# Patient Record
Sex: FEMALE | Race: WHITE | NOT HISPANIC OR LATINO
[De-identification: names, ages, dates, MRNs, and addresses within clinical notes are randomized per-mention and may not be internally consistent; named-entity substitution may affect disease eponyms.]

---

## 2017-01-23 ENCOUNTER — MEDICATION RENEWAL (OUTPATIENT)
Age: 37
End: 2017-01-23

## 2017-02-14 ENCOUNTER — MEDICATION RENEWAL (OUTPATIENT)
Age: 37
End: 2017-02-14

## 2017-02-16 ENCOUNTER — OUTPATIENT (OUTPATIENT)
Dept: OUTPATIENT SERVICES | Facility: HOSPITAL | Age: 37
LOS: 1 days | End: 2017-02-16
Payer: MEDICARE

## 2017-02-16 DIAGNOSIS — K08.9 DISORDER OF TEETH AND SUPPORTING STRUCTURES, UNSPECIFIED: ICD-10-CM

## 2017-02-16 PROCEDURE — D1110: CPT

## 2017-02-16 PROCEDURE — D0120: CPT

## 2017-02-17 DIAGNOSIS — Z01.20 ENCOUNTER FOR DENTAL EXAMINATION AND CLEANING WITHOUT ABNORMAL FINDINGS: ICD-10-CM

## 2017-02-28 ENCOUNTER — MEDICATION RENEWAL (OUTPATIENT)
Age: 37
End: 2017-02-28

## 2017-03-06 ENCOUNTER — MEDICATION RENEWAL (OUTPATIENT)
Age: 37
End: 2017-03-06

## 2017-03-06 ENCOUNTER — RX RENEWAL (OUTPATIENT)
Age: 37
End: 2017-03-06

## 2017-03-14 ENCOUNTER — APPOINTMENT (OUTPATIENT)
Dept: ULTRASOUND IMAGING | Facility: IMAGING CENTER | Age: 37
End: 2017-03-14

## 2017-03-14 ENCOUNTER — APPOINTMENT (OUTPATIENT)
Dept: MAMMOGRAPHY | Facility: IMAGING CENTER | Age: 37
End: 2017-03-14

## 2017-04-04 ENCOUNTER — RX RENEWAL (OUTPATIENT)
Age: 37
End: 2017-04-04

## 2017-05-01 ENCOUNTER — RX RENEWAL (OUTPATIENT)
Age: 37
End: 2017-05-01

## 2017-05-05 ENCOUNTER — MEDICATION RENEWAL (OUTPATIENT)
Age: 37
End: 2017-05-05

## 2017-05-09 ENCOUNTER — APPOINTMENT (OUTPATIENT)
Dept: NEUROLOGY | Facility: HOSPITAL | Age: 37
End: 2017-05-09

## 2017-05-09 ENCOUNTER — OUTPATIENT (OUTPATIENT)
Dept: OUTPATIENT SERVICES | Facility: HOSPITAL | Age: 37
LOS: 1 days | End: 2017-05-09
Payer: MEDICARE

## 2017-05-09 VITALS
WEIGHT: 125 LBS | BODY MASS INDEX: 23.6 KG/M2 | DIASTOLIC BLOOD PRESSURE: 73 MMHG | HEART RATE: 75 BPM | HEIGHT: 61 IN | SYSTOLIC BLOOD PRESSURE: 115 MMHG | RESPIRATION RATE: 14 BRPM

## 2017-05-09 DIAGNOSIS — G40.909 EPILEPSY, UNSPECIFIED, NOT INTRACTABLE, WITHOUT STATUS EPILEPTICUS: ICD-10-CM

## 2017-05-09 DIAGNOSIS — R56.9 UNSPECIFIED CONVULSIONS: ICD-10-CM

## 2017-05-09 PROCEDURE — 85027 COMPLETE CBC AUTOMATED: CPT

## 2017-05-09 PROCEDURE — G0463: CPT

## 2017-05-09 PROCEDURE — 36415 COLL VENOUS BLD VENIPUNCTURE: CPT

## 2017-05-11 DIAGNOSIS — F79 UNSPECIFIED INTELLECTUAL DISABILITIES: ICD-10-CM

## 2017-05-23 ENCOUNTER — MEDICATION RENEWAL (OUTPATIENT)
Age: 37
End: 2017-05-23

## 2017-06-09 ENCOUNTER — MEDICATION RENEWAL (OUTPATIENT)
Age: 37
End: 2017-06-09

## 2017-06-20 ENCOUNTER — APPOINTMENT (OUTPATIENT)
Dept: MAMMOGRAPHY | Facility: IMAGING CENTER | Age: 37
End: 2017-06-20

## 2017-06-20 ENCOUNTER — APPOINTMENT (OUTPATIENT)
Dept: ULTRASOUND IMAGING | Facility: IMAGING CENTER | Age: 37
End: 2017-06-20

## 2017-06-20 ENCOUNTER — OUTPATIENT (OUTPATIENT)
Dept: OUTPATIENT SERVICES | Facility: HOSPITAL | Age: 37
LOS: 1 days | End: 2017-06-20
Payer: MEDICARE

## 2017-06-20 DIAGNOSIS — Z00.8 ENCOUNTER FOR OTHER GENERAL EXAMINATION: ICD-10-CM

## 2017-06-20 PROCEDURE — 76641 ULTRASOUND BREAST COMPLETE: CPT

## 2017-06-28 ENCOUNTER — MEDICATION RENEWAL (OUTPATIENT)
Age: 37
End: 2017-06-28

## 2017-07-11 ENCOUNTER — RX RENEWAL (OUTPATIENT)
Age: 37
End: 2017-07-11

## 2017-08-04 ENCOUNTER — MEDICATION RENEWAL (OUTPATIENT)
Age: 37
End: 2017-08-04

## 2017-08-24 ENCOUNTER — OUTPATIENT (OUTPATIENT)
Dept: OUTPATIENT SERVICES | Facility: HOSPITAL | Age: 37
LOS: 1 days | End: 2017-08-24
Payer: MEDICARE

## 2017-08-24 DIAGNOSIS — K08.9 DISORDER OF TEETH AND SUPPORTING STRUCTURES, UNSPECIFIED: ICD-10-CM

## 2017-08-24 PROCEDURE — D0120: CPT

## 2017-08-24 PROCEDURE — D1110: CPT

## 2017-08-25 DIAGNOSIS — Z01.20 ENCOUNTER FOR DENTAL EXAMINATION AND CLEANING WITHOUT ABNORMAL FINDINGS: ICD-10-CM

## 2017-09-05 ENCOUNTER — RX RENEWAL (OUTPATIENT)
Age: 37
End: 2017-09-05

## 2017-09-14 ENCOUNTER — EMERGENCY (EMERGENCY)
Facility: HOSPITAL | Age: 37
LOS: 1 days | Discharge: HOME | End: 2017-09-14
Attending: EMERGENCY MEDICINE | Admitting: PERSONAL EMERGENCY RESPONSE ATTENDANT
Payer: MEDICARE

## 2017-09-14 VITALS
OXYGEN SATURATION: 97 % | HEART RATE: 110 BPM | DIASTOLIC BLOOD PRESSURE: 87 MMHG | TEMPERATURE: 97 F | RESPIRATION RATE: 20 BRPM | SYSTOLIC BLOOD PRESSURE: 117 MMHG

## 2017-09-14 LAB
ALBUMIN SERPL ELPH-MCNC: 4.7 G/DL — SIGNIFICANT CHANGE UP (ref 3.3–5)
ALP SERPL-CCNC: 52 U/L — SIGNIFICANT CHANGE UP (ref 40–120)
ALT FLD-CCNC: 15 U/L RC — SIGNIFICANT CHANGE UP (ref 10–45)
ANION GAP SERPL CALC-SCNC: 15 MMOL/L — SIGNIFICANT CHANGE UP (ref 5–17)
APTT BLD: 29.5 SEC — SIGNIFICANT CHANGE UP (ref 27.5–37.4)
AST SERPL-CCNC: 16 U/L — SIGNIFICANT CHANGE UP (ref 10–40)
BASE EXCESS BLDV CALC-SCNC: 3 MMOL/L — HIGH (ref -2–2)
BASOPHILS # BLD AUTO: 0 K/UL — SIGNIFICANT CHANGE UP (ref 0–0.2)
BILIRUB SERPL-MCNC: 1.1 MG/DL — SIGNIFICANT CHANGE UP (ref 0.2–1.2)
BUN SERPL-MCNC: 15 MG/DL — SIGNIFICANT CHANGE UP (ref 7–23)
CA-I SERPL-SCNC: 1.24 MMOL/L — SIGNIFICANT CHANGE UP (ref 1.12–1.3)
CALCIUM SERPL-MCNC: 9.6 MG/DL — SIGNIFICANT CHANGE UP (ref 8.4–10.5)
CHLORIDE BLDV-SCNC: 102 MMOL/L — SIGNIFICANT CHANGE UP (ref 96–108)
CHLORIDE SERPL-SCNC: 99 MMOL/L — SIGNIFICANT CHANGE UP (ref 96–108)
CO2 BLDV-SCNC: 31 MMOL/L — HIGH (ref 22–30)
CO2 SERPL-SCNC: 28 MMOL/L — SIGNIFICANT CHANGE UP (ref 22–31)
CREAT SERPL-MCNC: 0.41 MG/DL — LOW (ref 0.5–1.3)
EOSINOPHIL # BLD AUTO: 0 K/UL — SIGNIFICANT CHANGE UP (ref 0–0.5)
GAS PNL BLDV: 139 MMOL/L — SIGNIFICANT CHANGE UP (ref 136–145)
GAS PNL BLDV: SIGNIFICANT CHANGE UP
GAS PNL BLDV: SIGNIFICANT CHANGE UP
GLUCOSE BLDV-MCNC: 119 MG/DL — HIGH (ref 70–99)
GLUCOSE SERPL-MCNC: 123 MG/DL — HIGH (ref 70–99)
HCO3 BLDV-SCNC: 29 MMOL/L — SIGNIFICANT CHANGE UP (ref 21–29)
HCT VFR BLD CALC: 39.9 % — SIGNIFICANT CHANGE UP (ref 34.5–45)
HCT VFR BLDA CALC: 49 % — SIGNIFICANT CHANGE UP (ref 39–50)
HGB BLD CALC-MCNC: 16.1 G/DL — HIGH (ref 11.5–15.5)
HGB BLD-MCNC: 13.7 G/DL — SIGNIFICANT CHANGE UP (ref 11.5–15.5)
INR BLD: 1.3 RATIO — HIGH (ref 0.88–1.16)
LACTATE BLDV-MCNC: 2.1 MMOL/L — HIGH (ref 0.7–2)
LYMPHOCYTES # BLD AUTO: 0.7 K/UL — LOW (ref 1–3.3)
LYMPHOCYTES # BLD AUTO: 9 % — LOW (ref 13–44)
MCHC RBC-ENTMCNC: 33.3 PG — SIGNIFICANT CHANGE UP (ref 27–34)
MCHC RBC-ENTMCNC: 34.2 GM/DL — SIGNIFICANT CHANGE UP (ref 32–36)
MCV RBC AUTO: 97.4 FL — SIGNIFICANT CHANGE UP (ref 80–100)
MONOCYTES # BLD AUTO: 0.5 K/UL — SIGNIFICANT CHANGE UP (ref 0–0.9)
MONOCYTES NFR BLD AUTO: 3 % — SIGNIFICANT CHANGE UP (ref 2–14)
NEUTROPHILS # BLD AUTO: 6 K/UL — SIGNIFICANT CHANGE UP (ref 1.8–7.4)
NEUTROPHILS NFR BLD AUTO: 79 % — HIGH (ref 43–77)
NEUTS BAND # BLD: 9 % — HIGH (ref 0–8)
PCO2 BLDV: 52 MMHG — HIGH (ref 35–50)
PH BLDV: 7.37 — SIGNIFICANT CHANGE UP (ref 7.35–7.45)
PLAT MORPH BLD: NORMAL — SIGNIFICANT CHANGE UP
PLATELET # BLD AUTO: 160 K/UL — SIGNIFICANT CHANGE UP (ref 150–400)
PO2 BLDV: 26 MMHG — SIGNIFICANT CHANGE UP (ref 25–45)
POTASSIUM BLDV-SCNC: 3.4 MMOL/L — LOW (ref 3.5–5)
POTASSIUM SERPL-MCNC: 3.8 MMOL/L — SIGNIFICANT CHANGE UP (ref 3.5–5.3)
POTASSIUM SERPL-SCNC: 3.8 MMOL/L — SIGNIFICANT CHANGE UP (ref 3.5–5.3)
PROT SERPL-MCNC: 7.7 G/DL — SIGNIFICANT CHANGE UP (ref 6–8.3)
PROTHROM AB SERPL-ACNC: 14.3 SEC — HIGH (ref 9.8–12.7)
RBC # BLD: 4.09 M/UL — SIGNIFICANT CHANGE UP (ref 3.8–5.2)
RBC # FLD: 11 % — SIGNIFICANT CHANGE UP (ref 10.3–14.5)
RBC BLD AUTO: NORMAL — SIGNIFICANT CHANGE UP
SAO2 % BLDV: 39 % — LOW (ref 67–88)
SODIUM SERPL-SCNC: 142 MMOL/L — SIGNIFICANT CHANGE UP (ref 135–145)
WBC # BLD: 7.3 K/UL — SIGNIFICANT CHANGE UP (ref 3.8–10.5)
WBC # FLD AUTO: 7.3 K/UL — SIGNIFICANT CHANGE UP (ref 3.8–10.5)

## 2017-09-14 PROCEDURE — 93010 ELECTROCARDIOGRAM REPORT: CPT

## 2017-09-14 PROCEDURE — 99284 EMERGENCY DEPT VISIT MOD MDM: CPT | Mod: 25,GC

## 2017-09-14 RX ORDER — ACETAMINOPHEN 500 MG
1000 TABLET ORAL ONCE
Qty: 0 | Refills: 0 | Status: COMPLETED | OUTPATIENT
Start: 2017-09-14 | End: 2017-09-14

## 2017-09-14 RX ORDER — SODIUM CHLORIDE 9 MG/ML
1000 INJECTION INTRAMUSCULAR; INTRAVENOUS; SUBCUTANEOUS ONCE
Qty: 0 | Refills: 0 | Status: COMPLETED | OUTPATIENT
Start: 2017-09-14 | End: 2017-09-14

## 2017-09-14 RX ADMIN — SODIUM CHLORIDE 1000 MILLILITER(S): 9 INJECTION INTRAMUSCULAR; INTRAVENOUS; SUBCUTANEOUS at 23:19

## 2017-09-14 RX ADMIN — Medication 400 MILLIGRAM(S): at 23:19

## 2017-09-14 NOTE — ED PROVIDER NOTE - PLAN OF CARE
1) Take Tylenol 325mg tablet, take 2 tablets every 6-8 hours as needed for pain   2) Please follow up with your primary medical doctor in 1-2 days for reevaluation. If you do not have pmd please call the general medicine clinic for an appointment at [phone].   3) You were given a copy of your results, please show them to your doctor for review.   4) Return to the ED for worsening pain, nausea, vomiting, fever greater than 100.4, chest pain, shortness of breath, diarrhea, constipation, blood in stool, or if you have any other new, worsening, or concerning symptoms.

## 2017-09-14 NOTE — ED PROVIDER NOTE - OBJECTIVE STATEMENT
36 year old female, past medical history cerebral palsy (understands sign language), deadness congenital, myopia, who presents to the ED for abdominal pain for 1 day. Was seen by urgent care and sent for concern for appendicitis.     primary medical doctor: Dr. Tal Chadwick [phone] (Group home phone nuber) 36 year old female, past medical history cerebral palsy (understands sign language), deafness congenital, myopia, who presents to the ED for abdominal pain for 1 day. Reports diffuse abdominal pain, nausea, no vomiting. Was seen by urgent care and sent for concern for appendicitis. Here in the ED with group aide.     primary medical doctor: Dr. Tal Chadwick [phone] (Group home phone nuber)  sIgn language [place] [address]

## 2017-09-14 NOTE — ED PROVIDER NOTE - MEDICAL DECISION MAKING DETAILS
Hector HERNANDEZ:  Patient is a 35 yo F with history of Cerebral Palsy, Congenital Deafness, MR brought in for evaluation of abdominal pain and concern for early appendicitis. Per group home rep, patient has been complaining of abdominal pain all day and went to outside doctor. exam: unable to tell if patient is at her baseline, RRR, lungs CTA, abd soft, ttp in RLQ. a/p: abdominal pain, will get labs, ct abd/pelvis to r/o appy

## 2017-09-14 NOTE — ED ADULT NURSE NOTE - OBJECTIVE STATEMENT
37 y/o female non verbal, aide at bedside, stated pmhx of cerebral palsy, mild mental retardation, deaf but uses sign language. Presents to ED sent from urgent care center for R/O appendicitis. Pt had decreased po intake and RLQ pain which started today. Tenderness present upon palpation. Pt grimacing and grinding teeth. Upon further assessment, airway patent and intact, nonverbal s/s chest pain/SOB not present. ABD soft, tender, aide denies n/v/d. Denies blood in urine and/or stool. Skin intact. Peripheral pulses strong x4. Safety and comfort measures maintained. 35 y/o female non verbal, aide at bedside, stated pmhx of cerebral palsy, mild mental retardation, deaf but uses sign language;  services offered, aide at bedside refused. Presents to ED sent from urgent care center for R/O appendicitis. Pt had decreased po intake and RLQ pain which started today. Tenderness present upon palpation. Pt grimacing and grinding teeth. Upon further assessment, airway patent and intact, nonverbal s/s chest pain/SOB not present. ABD soft, tender, aide denies n/v/d. Denies blood in urine and/or stool. Skin intact. Peripheral pulses strong x4. Safety and comfort measures maintained.

## 2017-09-14 NOTE — ED PROVIDER NOTE - PROGRESS NOTE DETAILS
Attending MD Patton.  Pt signed out to me in stable condition pending work-up for concern for appy. Hx CP, group home, rep with her, congenital deafness, abd'l pain all day long.  Exam inconclusive and limited by pt’s functional status. Pain improved, still nausea will give zofran, PO challenge, abdomen with minimal tenderness but unreliable exam. discussed with aide, strict return precautions. [employer]:  Pt endorses mild nausea, no focal TTP.  CT, labs and urine non-actionable.  Zofran dosed and pt to be PO challenged. passed PO challenge will d/c

## 2017-09-14 NOTE — ED PROVIDER NOTE - CARE PLAN
Principal Discharge DX:	Abdominal pain  Instructions for follow-up, activity and diet:	1) Take Tylenol 325mg tablet, take 2 tablets every 6-8 hours as needed for pain   2) Please follow up with your primary medical doctor in 1-2 days for reevaluation. If you do not have pmd please call the general medicine clinic for an appointment at [phone].   3) You were given a copy of your results, please show them to your doctor for review.   4) Return to the ED for worsening pain, nausea, vomiting, fever greater than 100.4, chest pain, shortness of breath, diarrhea, constipation, blood in stool, or if you have any other new, worsening, or concerning symptoms.

## 2017-09-15 VITALS
RESPIRATION RATE: 17 BRPM | HEART RATE: 108 BPM | DIASTOLIC BLOOD PRESSURE: 77 MMHG | TEMPERATURE: 98 F | OXYGEN SATURATION: 96 % | SYSTOLIC BLOOD PRESSURE: 109 MMHG

## 2017-09-15 LAB
APPEARANCE UR: CLEAR — SIGNIFICANT CHANGE UP
BILIRUB UR-MCNC: NEGATIVE — SIGNIFICANT CHANGE UP
COLOR SPEC: SIGNIFICANT CHANGE UP
DIFF PNL FLD: NEGATIVE — SIGNIFICANT CHANGE UP
EPI CELLS # UR: SIGNIFICANT CHANGE UP /HPF
GLUCOSE UR QL: NEGATIVE — SIGNIFICANT CHANGE UP
HCG SERPL-ACNC: 2.2 MIU/ML — SIGNIFICANT CHANGE UP
KETONES UR-MCNC: NEGATIVE — SIGNIFICANT CHANGE UP
LEUKOCYTE ESTERASE UR-ACNC: NEGATIVE — SIGNIFICANT CHANGE UP
NITRITE UR-MCNC: NEGATIVE — SIGNIFICANT CHANGE UP
PH UR: 6.5 — SIGNIFICANT CHANGE UP (ref 5–8)
PROT UR-MCNC: SIGNIFICANT CHANGE UP
RBC CASTS # UR COMP ASSIST: SIGNIFICANT CHANGE UP /HPF (ref 0–2)
SP GR SPEC: >1.03 — HIGH (ref 1.01–1.02)
UROBILINOGEN FLD QL: NEGATIVE — SIGNIFICANT CHANGE UP
WBC UR QL: SIGNIFICANT CHANGE UP /HPF (ref 0–5)

## 2017-09-15 PROCEDURE — 74177 CT ABD & PELVIS W/CONTRAST: CPT | Mod: 26

## 2017-09-15 RX ORDER — ONDANSETRON 8 MG/1
4 TABLET, FILM COATED ORAL ONCE
Qty: 0 | Refills: 0 | Status: COMPLETED | OUTPATIENT
Start: 2017-09-15 | End: 2017-09-15

## 2017-09-15 RX ORDER — SODIUM CHLORIDE 9 MG/ML
1000 INJECTION INTRAMUSCULAR; INTRAVENOUS; SUBCUTANEOUS ONCE
Qty: 0 | Refills: 0 | Status: COMPLETED | OUTPATIENT
Start: 2017-09-15 | End: 2017-09-15

## 2017-09-15 RX ADMIN — ONDANSETRON 4 MILLIGRAM(S): 8 TABLET, FILM COATED ORAL at 06:20

## 2017-09-15 RX ADMIN — SODIUM CHLORIDE 2000 MILLILITER(S): 9 INJECTION INTRAMUSCULAR; INTRAVENOUS; SUBCUTANEOUS at 02:18

## 2017-09-15 NOTE — ED ADULT NURSE REASSESSMENT NOTE - NS ED NURSE REASSESS COMMENT FT1
pt given contrast for CT scan  tolerating well  Aide verbalized understanding of reason for administration and instructions
As per aide bedside, tolerating PO intake. To be D/C
As requested by JERRI Patel from [facility], lab results given to group home .
PO trial performed. Patient provided with water. Will reassess
Patient appears to be in more comfort after having bowel movement. Urine unable to be obtained due to patient's baseline mental status. MD alerted that patient needs pregnancy test prior to pending CT scane. Second liter of fluid hung
Straight cath performed, 2 RN's bedside. Urine specimens sent.

## 2017-09-18 ENCOUNTER — APPOINTMENT (OUTPATIENT)
Dept: INTERNAL MEDICINE | Facility: CLINIC | Age: 37
End: 2017-09-18
Payer: MEDICARE

## 2017-09-18 ENCOUNTER — OUTPATIENT (OUTPATIENT)
Dept: OUTPATIENT SERVICES | Facility: HOSPITAL | Age: 37
LOS: 1 days | End: 2017-09-18
Payer: MEDICARE

## 2017-09-18 VITALS
WEIGHT: 121 LBS | DIASTOLIC BLOOD PRESSURE: 60 MMHG | SYSTOLIC BLOOD PRESSURE: 115 MMHG | BODY MASS INDEX: 22.84 KG/M2 | HEIGHT: 61 IN

## 2017-09-18 DIAGNOSIS — R10.9 UNSPECIFIED ABDOMINAL PAIN: ICD-10-CM

## 2017-09-18 DIAGNOSIS — I10 ESSENTIAL (PRIMARY) HYPERTENSION: ICD-10-CM

## 2017-09-18 DIAGNOSIS — G40.909 EPILEPSY, UNSPECIFIED, NOT INTRACTABLE, WITHOUT STATUS EPILEPTICUS: ICD-10-CM

## 2017-09-18 PROCEDURE — G0463: CPT

## 2017-09-18 PROCEDURE — 99213 OFFICE O/P EST LOW 20 MIN: CPT | Mod: GE

## 2017-09-28 ENCOUNTER — OUTPATIENT (OUTPATIENT)
Dept: OUTPATIENT SERVICES | Facility: HOSPITAL | Age: 37
LOS: 1 days | End: 2017-09-28

## 2017-09-28 DIAGNOSIS — K08.9 DISORDER OF TEETH AND SUPPORTING STRUCTURES, UNSPECIFIED: ICD-10-CM

## 2017-10-02 ENCOUNTER — MEDICATION RENEWAL (OUTPATIENT)
Age: 37
End: 2017-10-02

## 2017-10-03 ENCOUNTER — MEDICATION RENEWAL (OUTPATIENT)
Age: 37
End: 2017-10-03

## 2017-10-03 ENCOUNTER — RX RENEWAL (OUTPATIENT)
Age: 37
End: 2017-10-03

## 2017-10-16 ENCOUNTER — RX RENEWAL (OUTPATIENT)
Age: 37
End: 2017-10-16

## 2017-10-26 ENCOUNTER — OUTPATIENT (OUTPATIENT)
Dept: OUTPATIENT SERVICES | Facility: HOSPITAL | Age: 37
LOS: 1 days | End: 2017-10-26
Payer: MEDICARE

## 2017-10-26 DIAGNOSIS — K08.9 DISORDER OF TEETH AND SUPPORTING STRUCTURES, UNSPECIFIED: ICD-10-CM

## 2017-10-26 PROCEDURE — D7140: CPT

## 2017-10-27 DIAGNOSIS — K02.9 DENTAL CARIES, UNSPECIFIED: ICD-10-CM

## 2017-10-30 ENCOUNTER — LABORATORY RESULT (OUTPATIENT)
Age: 37
End: 2017-10-30

## 2017-10-31 ENCOUNTER — OUTPATIENT (OUTPATIENT)
Dept: OUTPATIENT SERVICES | Facility: HOSPITAL | Age: 37
LOS: 1 days | End: 2017-10-31
Payer: MEDICARE

## 2017-10-31 ENCOUNTER — APPOINTMENT (OUTPATIENT)
Dept: OBGYN | Facility: CLINIC | Age: 37
End: 2017-10-31
Payer: MEDICARE

## 2017-10-31 VITALS
SYSTOLIC BLOOD PRESSURE: 110 MMHG | DIASTOLIC BLOOD PRESSURE: 60 MMHG | BODY MASS INDEX: 22.84 KG/M2 | HEIGHT: 61 IN | WEIGHT: 121 LBS

## 2017-10-31 DIAGNOSIS — N76.0 ACUTE VAGINITIS: ICD-10-CM

## 2017-10-31 DIAGNOSIS — Z01.419 ENCOUNTER FOR GYNECOLOGICAL EXAMINATION (GENERAL) (ROUTINE) WITHOUT ABNORMAL FINDINGS: ICD-10-CM

## 2017-10-31 PROCEDURE — G0101: CPT | Mod: NC

## 2017-10-31 PROCEDURE — G0101: CPT

## 2017-10-31 PROCEDURE — 87624 HPV HI-RISK TYP POOLED RSLT: CPT

## 2017-11-01 LAB — HPV HIGH+LOW RISK DNA PNL CVX: SIGNIFICANT CHANGE UP

## 2017-11-03 LAB — CYTOLOGY SPEC DOC CYTO: SIGNIFICANT CHANGE UP

## 2017-11-07 ENCOUNTER — RX RENEWAL (OUTPATIENT)
Age: 37
End: 2017-11-07

## 2017-11-10 ENCOUNTER — OUTPATIENT (OUTPATIENT)
Dept: OUTPATIENT SERVICES | Facility: HOSPITAL | Age: 37
LOS: 1 days | End: 2017-11-10
Payer: MEDICARE

## 2017-11-10 ENCOUNTER — APPOINTMENT (OUTPATIENT)
Dept: INTERNAL MEDICINE | Facility: CLINIC | Age: 37
End: 2017-11-10
Payer: MEDICARE

## 2017-11-10 VITALS
SYSTOLIC BLOOD PRESSURE: 110 MMHG | WEIGHT: 122 LBS | DIASTOLIC BLOOD PRESSURE: 80 MMHG | BODY MASS INDEX: 23.03 KG/M2 | HEIGHT: 61 IN

## 2017-11-10 VITALS — TEMPERATURE: 98.7 F | HEART RATE: 68 BPM

## 2017-11-10 VITALS — RESPIRATION RATE: 12 BRPM

## 2017-11-10 DIAGNOSIS — D55.0 ANEMIA DUE TO GLUCOSE-6-PHOSPHATE DEHYDROGENASE [G6PD] DEFICIENCY: ICD-10-CM

## 2017-11-10 DIAGNOSIS — R10.9 UNSPECIFIED ABDOMINAL PAIN: ICD-10-CM

## 2017-11-10 DIAGNOSIS — I10 ESSENTIAL (PRIMARY) HYPERTENSION: ICD-10-CM

## 2017-11-10 DIAGNOSIS — G40.909 EPILEPSY, UNSPECIFIED, NOT INTRACTABLE, WITHOUT STATUS EPILEPTICUS: ICD-10-CM

## 2017-11-10 PROCEDURE — G0463: CPT

## 2017-11-10 PROCEDURE — 99213 OFFICE O/P EST LOW 20 MIN: CPT | Mod: GE

## 2017-11-14 ENCOUNTER — OUTPATIENT (OUTPATIENT)
Dept: OUTPATIENT SERVICES | Facility: HOSPITAL | Age: 37
LOS: 1 days | End: 2017-11-14
Payer: MEDICARE

## 2017-11-14 ENCOUNTER — APPOINTMENT (OUTPATIENT)
Dept: NEUROLOGY | Facility: HOSPITAL | Age: 37
End: 2017-11-14

## 2017-11-14 VITALS
WEIGHT: 122 LBS | SYSTOLIC BLOOD PRESSURE: 110 MMHG | BODY MASS INDEX: 23.03 KG/M2 | DIASTOLIC BLOOD PRESSURE: 62 MMHG | HEIGHT: 61 IN | HEART RATE: 88 BPM

## 2017-11-14 DIAGNOSIS — R56.9 UNSPECIFIED CONVULSIONS: ICD-10-CM

## 2017-11-14 DIAGNOSIS — G80.9 CEREBRAL PALSY, UNSPECIFIED: ICD-10-CM

## 2017-11-14 DIAGNOSIS — G40.909 EPILEPSY, UNSPECIFIED, NOT INTRACTABLE, WITHOUT STATUS EPILEPTICUS: ICD-10-CM

## 2017-11-14 PROCEDURE — G0463: CPT

## 2017-11-14 PROCEDURE — 85027 COMPLETE CBC AUTOMATED: CPT

## 2017-11-14 PROCEDURE — 80164 ASSAY DIPROPYLACETIC ACD TOT: CPT

## 2017-11-14 PROCEDURE — 80053 COMPREHEN METABOLIC PANEL: CPT

## 2017-11-14 PROCEDURE — 36415 COLL VENOUS BLD VENIPUNCTURE: CPT

## 2017-12-07 ENCOUNTER — RX RENEWAL (OUTPATIENT)
Age: 37
End: 2017-12-07

## 2018-01-09 ENCOUNTER — RX RENEWAL (OUTPATIENT)
Age: 38
End: 2018-01-09

## 2018-02-13 ENCOUNTER — RX RENEWAL (OUTPATIENT)
Age: 38
End: 2018-02-13

## 2018-03-13 ENCOUNTER — RX RENEWAL (OUTPATIENT)
Age: 38
End: 2018-03-13

## 2018-04-04 ENCOUNTER — RX RENEWAL (OUTPATIENT)
Age: 38
End: 2018-04-04

## 2018-04-10 ENCOUNTER — MEDICATION RENEWAL (OUTPATIENT)
Age: 38
End: 2018-04-10

## 2018-05-03 ENCOUNTER — FORM ENCOUNTER (OUTPATIENT)
Age: 38
End: 2018-05-03

## 2018-05-04 ENCOUNTER — OUTPATIENT (OUTPATIENT)
Dept: OUTPATIENT SERVICES | Facility: HOSPITAL | Age: 38
LOS: 1 days | End: 2018-05-04
Payer: MEDICARE

## 2018-05-04 ENCOUNTER — APPOINTMENT (OUTPATIENT)
Dept: ULTRASOUND IMAGING | Facility: IMAGING CENTER | Age: 38
End: 2018-05-04
Payer: MEDICARE

## 2018-05-04 DIAGNOSIS — N63.0 UNSPECIFIED LUMP IN UNSPECIFIED BREAST: ICD-10-CM

## 2018-05-04 PROCEDURE — 76641 ULTRASOUND BREAST COMPLETE: CPT | Mod: 26,50

## 2018-05-04 PROCEDURE — 76641 ULTRASOUND BREAST COMPLETE: CPT

## 2018-05-15 ENCOUNTER — APPOINTMENT (OUTPATIENT)
Dept: NEUROLOGY | Facility: HOSPITAL | Age: 38
End: 2018-05-15
Payer: MEDICARE

## 2018-05-15 ENCOUNTER — OUTPATIENT (OUTPATIENT)
Dept: OUTPATIENT SERVICES | Facility: HOSPITAL | Age: 38
LOS: 1 days | End: 2018-05-15
Payer: MEDICARE

## 2018-05-15 VITALS
SYSTOLIC BLOOD PRESSURE: 97 MMHG | BODY MASS INDEX: 22.66 KG/M2 | WEIGHT: 120 LBS | HEIGHT: 61 IN | RESPIRATION RATE: 14 BRPM | DIASTOLIC BLOOD PRESSURE: 67 MMHG | HEART RATE: 90 BPM

## 2018-05-15 DIAGNOSIS — G80.9 CEREBRAL PALSY, UNSPECIFIED: ICD-10-CM

## 2018-05-15 DIAGNOSIS — R56.9 UNSPECIFIED CONVULSIONS: ICD-10-CM

## 2018-05-15 DIAGNOSIS — G40.909 EPILEPSY, UNSPECIFIED, NOT INTRACTABLE, WITHOUT STATUS EPILEPTICUS: ICD-10-CM

## 2018-05-15 PROCEDURE — G0463: CPT

## 2018-05-15 PROCEDURE — 99213 OFFICE O/P EST LOW 20 MIN: CPT | Mod: GC

## 2018-05-31 ENCOUNTER — OUTPATIENT (OUTPATIENT)
Dept: OUTPATIENT SERVICES | Facility: HOSPITAL | Age: 38
LOS: 1 days | End: 2018-05-31
Payer: MEDICARE

## 2018-05-31 DIAGNOSIS — K08.9 DISORDER OF TEETH AND SUPPORTING STRUCTURES, UNSPECIFIED: ICD-10-CM

## 2018-05-31 PROCEDURE — D0270: CPT

## 2018-05-31 PROCEDURE — D0120: CPT

## 2018-05-31 PROCEDURE — D1110: CPT

## 2018-06-04 DIAGNOSIS — Z01.20 ENCOUNTER FOR DENTAL EXAMINATION AND CLEANING WITHOUT ABNORMAL FINDINGS: ICD-10-CM

## 2018-06-05 ENCOUNTER — RX RENEWAL (OUTPATIENT)
Age: 38
End: 2018-06-05

## 2018-06-12 ENCOUNTER — OUTPATIENT (OUTPATIENT)
Dept: OUTPATIENT SERVICES | Facility: HOSPITAL | Age: 38
LOS: 1 days | End: 2018-06-12
Payer: MEDICARE

## 2018-06-12 ENCOUNTER — RX RENEWAL (OUTPATIENT)
Age: 38
End: 2018-06-12

## 2018-06-12 ENCOUNTER — APPOINTMENT (OUTPATIENT)
Dept: INTERNAL MEDICINE | Facility: CLINIC | Age: 38
End: 2018-06-12
Payer: MEDICARE

## 2018-06-12 VITALS
SYSTOLIC BLOOD PRESSURE: 112 MMHG | HEIGHT: 61 IN | BODY MASS INDEX: 22.28 KG/M2 | DIASTOLIC BLOOD PRESSURE: 70 MMHG | WEIGHT: 118 LBS

## 2018-06-12 DIAGNOSIS — G40.909 EPILEPSY, UNSPECIFIED, NOT INTRACTABLE, WITHOUT STATUS EPILEPTICUS: ICD-10-CM

## 2018-06-12 DIAGNOSIS — I10 ESSENTIAL (PRIMARY) HYPERTENSION: ICD-10-CM

## 2018-06-12 DIAGNOSIS — G80.9 CEREBRAL PALSY, UNSPECIFIED: ICD-10-CM

## 2018-06-12 PROCEDURE — 99213 OFFICE O/P EST LOW 20 MIN: CPT | Mod: GE

## 2018-06-12 PROCEDURE — G0463: CPT

## 2018-06-14 NOTE — PHYSICAL EXAM
[General Appearance - Alert] : alert [General Appearance - In No Acute Distress] : in no acute distress [PERRL With Normal Accommodation] : pupils were equal in size, round, and reactive to light [Outer Ear] : the ears and nose were normal in appearance [Neck Appearance] : the appearance of the neck was normal [] : no respiratory distress [Respiration, Rhythm And Depth] : normal respiratory rhythm and effort [Auscultation Breath Sounds / Voice Sounds] : lungs were clear to auscultation bilaterally [Heart Rate And Rhythm] : heart rate was normal and rhythm regular [Heart Sounds] : normal S1 and S2 [Arterial Pulses Carotid] : carotid pulses were normal with no bruits [Edema] : there was no peripheral edema [Bowel Sounds] : normal bowel sounds [Abdomen Soft] : soft [Abdomen Tenderness] : non-tender [Cervical Lymph Nodes Enlarged Posterior Bilaterally] : posterior cervical [Cervical Lymph Nodes Enlarged Anterior Bilaterally] : anterior cervical [No CVA Tenderness] : no ~M costovertebral angle tenderness [No Spinal Tenderness] : no spinal tenderness [Abnormal Walk] : normal gait [Musculoskeletal - Swelling] : no joint swelling seen [Skin Color & Pigmentation] : normal skin color and pigmentation [No Focal Deficits] : no focal deficits [FreeTextEntry1] : Patient non-cooperative

## 2018-06-14 NOTE — REVIEW OF SYSTEMS
[Negative] : Heme/Lymph [Fever] : no fever [Chills] : no chills [Feeling Poorly] : not feeling poorly [Eye Pain] : no eye pain [Eyesight Problems] : no eyesight problems [Nosebleeds] : no nosebleeds [Chest Pain] : no chest pain [Palpitations] : no palpitations [Shortness Of Breath] : no shortness of breath [Wheezing] : no wheezing [Cough] : no cough [Abdominal Pain] : no abdominal pain [Vomiting] : no vomiting [Constipation] : no constipation [Diarrhea] : no diarrhea [Arthralgias] : no arthralgias [Joint Swelling] : no joint swelling [Skin Lesions] : no skin lesions [Itching] : no itching [Dizziness] : no dizziness [Fainting] : no fainting

## 2018-06-14 NOTE — ASSESSMENT
[FreeTextEntry1] : 37 y/o woman w/ hx of CP, MR, deafness, G6PD deficiency and seizure disorder who presents for f/u, no complaints, reported to be doing well.\par \par \par - Medication refilled including depakote, folate, MVI, calcium, flonase refilled.\par - Patient gets rest of medication from psychiatry.\par - c/w neuro f/u as scheduled 8/18.\par - No blood work needed today.\par - CPE due 11/17

## 2018-06-14 NOTE — HISTORY OF PRESENT ILLNESS
[Formal Caregiver] : formal caregiver [FreeTextEntry1] : Medication refill [de-identified] : 35 y/o woman w/ hx of CP, MR, deafness, G6PD deficiency and seizure disorder who presents for f/u. Patient only requesting medication refill today. Patient has no new complaints today. ROS negative including weight change, mood change, sob, cp, dizziness, seizure, fever, chills, abd pain, n/v/d/c. Patient has been compliant on her prescribed medication and reported no SE.

## 2018-07-10 ENCOUNTER — RX RENEWAL (OUTPATIENT)
Age: 38
End: 2018-07-10

## 2018-08-28 ENCOUNTER — APPOINTMENT (OUTPATIENT)
Dept: NEUROLOGY | Facility: HOSPITAL | Age: 38
End: 2018-08-28

## 2018-08-28 ENCOUNTER — LABORATORY RESULT (OUTPATIENT)
Age: 38
End: 2018-08-28

## 2018-08-28 ENCOUNTER — OUTPATIENT (OUTPATIENT)
Dept: OUTPATIENT SERVICES | Facility: HOSPITAL | Age: 38
LOS: 1 days | End: 2018-08-28
Payer: MEDICARE

## 2018-08-28 VITALS
WEIGHT: 110 LBS | HEART RATE: 80 BPM | BODY MASS INDEX: 20.77 KG/M2 | SYSTOLIC BLOOD PRESSURE: 80 MMHG | HEIGHT: 61 IN | DIASTOLIC BLOOD PRESSURE: 42 MMHG

## 2018-08-28 DIAGNOSIS — G80.9 CEREBRAL PALSY, UNSPECIFIED: ICD-10-CM

## 2018-08-28 DIAGNOSIS — R25.2 CRAMP AND SPASM: ICD-10-CM

## 2018-08-28 DIAGNOSIS — G40.909 EPILEPSY, UNSPECIFIED, NOT INTRACTABLE, WITHOUT STATUS EPILEPTICUS: ICD-10-CM

## 2018-08-28 DIAGNOSIS — R56.9 UNSPECIFIED CONVULSIONS: ICD-10-CM

## 2018-08-28 PROBLEM — H52.10 MYOPIA, UNSPECIFIED EYE: Chronic | Status: ACTIVE | Noted: 2017-09-14

## 2018-08-28 PROBLEM — H90.5 UNSPECIFIED SENSORINEURAL HEARING LOSS: Chronic | Status: ACTIVE | Noted: 2017-09-14

## 2018-08-28 PROBLEM — F79 UNSPECIFIED INTELLECTUAL DISABILITIES: Chronic | Status: ACTIVE | Noted: 2017-09-14

## 2018-08-28 PROCEDURE — G0463: CPT

## 2018-09-04 ENCOUNTER — RESULT CHARGE (OUTPATIENT)
Age: 38
End: 2018-09-04

## 2018-09-04 ENCOUNTER — RESULT REVIEW (OUTPATIENT)
Age: 38
End: 2018-09-04

## 2018-10-09 ENCOUNTER — RX RENEWAL (OUTPATIENT)
Age: 38
End: 2018-10-09

## 2018-10-10 ENCOUNTER — EMERGENCY (EMERGENCY)
Facility: HOSPITAL | Age: 38
LOS: 1 days | Discharge: HOME | End: 2018-10-10
Attending: EMERGENCY MEDICINE
Payer: MEDICARE

## 2018-10-10 VITALS
RESPIRATION RATE: 20 BRPM | TEMPERATURE: 99 F | SYSTOLIC BLOOD PRESSURE: 110 MMHG | DIASTOLIC BLOOD PRESSURE: 71 MMHG | HEART RATE: 89 BPM | OXYGEN SATURATION: 96 %

## 2018-10-10 PROCEDURE — 82962 GLUCOSE BLOOD TEST: CPT

## 2018-10-10 PROCEDURE — 99282 EMERGENCY DEPT VISIT SF MDM: CPT

## 2018-10-10 PROCEDURE — 99283 EMERGENCY DEPT VISIT LOW MDM: CPT | Mod: GC

## 2018-10-10 NOTE — ED PROVIDER NOTE - OBJECTIVE STATEMENT
37 year old female with h/o MR and cerebral palsy bib home health with ? decreased responsiveness. She and daily aide deny alteration. No headache, dizziness, nausea/vom, weakness, pain. She states she feels completely fine and doesn't know why she is here. Per signing  who has seen patient previously many times--she is unchanged from baseline. 37 year old female with h/o MR and cerebral palsy bib home health with Regina Delgado, Deaf health services decreased responsiveness. She and daily aide deny alteration. No headache, dizziness, nausea/vom, weakness, pain. She states she feels completely fine and doesn't know why she is here. Per signing  who has seen patient previously many times--she is unchanged from baseline.

## 2018-10-10 NOTE — ED ADULT NURSE REASSESSMENT NOTE - NS ED NURSE REASSESS COMMENT FT1
Patient communicates using ASL. Regina Delgado is onsite for another patient and aware that this patient needs  services.

## 2018-10-10 NOTE — ED ADULT NURSE NOTE - CHIEF COMPLAINT QUOTE
Weakness. Per LMSW at [facility] "showing signs of being lethargic, zoning off while staff has been trying to get her attention, and standing in one place for a long time while staring into space...response time is delayed" Symptoms started yesterday 10/9/18. Patient is non-verbal at triage and presents with a staff member from [facility].

## 2018-10-10 NOTE — ED ADULT NURSE NOTE - NSIMPLEMENTINTERV_GEN_ALL_ED
Implemented All Fall Risk Interventions:  [place] to call system. Call bell, personal items and telephone within reach. Instruct patient to call for assistance. Room bathroom lighting operational. Non-slip footwear when patient is off stretcher. Physically safe environment: no spills, clutter or unnecessary equipment. Stretcher in lowest position, wheels locked, appropriate side rails in place. Provide visual cue, wrist band, yellow gown, etc. Monitor gait and stability. Monitor for mental status changes and reorient to person, place, and time. Review medications for side effects contributing to fall risk. Reinforce activity limits and safety measures with patient and family.

## 2018-10-10 NOTE — ED PROVIDER NOTE - MEDICAL DECISION MAKING DETAILS
MD Patria,Attending: pt seen. agree with above HPI/ROS/PE. pateint DENIES any sxs. Aide DENIES any evidence of change in baseline behavior or function. FSBG WNL. DC home for any concerns

## 2018-10-10 NOTE — ED ADULT TRIAGE NOTE - CHIEF COMPLAINT QUOTE
Weakness. Per LMSW at [facility] "showing signs of being lethargic, zoning off while staff has been trying to get her attention, and standing in one place for a long time while staring into space...response time is delayed" Symptoms started yesterday 10/9/18. Patient is non-verbal at triage and presents with a staff member from [facility].

## 2018-10-10 NOTE — ED ADULT NURSE NOTE - OBJECTIVE STATEMENT
36 y/o female PMH MR and cerebral palsy, uses  (who is at bedside) presents to ED from group home with daily home health aid for reported lethargy and weakness. Pt was at [facility] and was sent home with note saying pt appears lethargic and "zoning out" when someone spoke to her. Daily health aid and staff at group home deny change in LOC or regular behavior for pt.  at bedside. Pt denies weakness, SOB, chest pain, dizziness, n/v/d, any other symptoms. She states, "everything is ok and I feel fine." Blood glucose is 96. Pt appears well and is laughing at speaking with aid and .

## 2018-10-15 ENCOUNTER — APPOINTMENT (OUTPATIENT)
Dept: INTERNAL MEDICINE | Facility: CLINIC | Age: 38
End: 2018-10-15
Payer: MEDICARE

## 2018-10-15 ENCOUNTER — OUTPATIENT (OUTPATIENT)
Dept: OUTPATIENT SERVICES | Facility: HOSPITAL | Age: 38
LOS: 1 days | End: 2018-10-15
Payer: MEDICARE

## 2018-10-15 VITALS — DIASTOLIC BLOOD PRESSURE: 70 MMHG | HEART RATE: 75 BPM | SYSTOLIC BLOOD PRESSURE: 117 MMHG

## 2018-10-15 DIAGNOSIS — I10 ESSENTIAL (PRIMARY) HYPERTENSION: ICD-10-CM

## 2018-10-15 PROCEDURE — G0008: CPT

## 2018-10-15 PROCEDURE — 99213 OFFICE O/P EST LOW 20 MIN: CPT | Mod: GE

## 2018-10-15 PROCEDURE — G0463: CPT

## 2018-10-15 PROCEDURE — 90688 IIV4 VACCINE SPLT 0.5 ML IM: CPT

## 2018-10-15 NOTE — PLAN
[FreeTextEntry1] : 37 yo F with h/o CP, MR, G6PD deficiency, seizure disorder, and deafness here for ED follow-up for lethargy and due for flu shot. \par \par #Lethargy- ? transient, resolved\par - per patient and caretaker, she is at her baseline\par - ED visit was benign, no labs or imaging done at that visit \par - c/w home meds\par \par #HCM\par - Flu shot given today \par \par RTC for CPE (due in November) \par \par Seen and discussed with Dr. Uriostegui\par \par No Romero PGY1

## 2018-10-15 NOTE — HISTORY OF PRESENT ILLNESS
[Formal Caregiver] : formal caregiver [ Service] : provided by  Service [FreeTextEntry8] : 39 yo F with h/o CP, MR, G6PD deficiency, seizure disorder, and deafness here for ED follow-up. She was taken to the ED on 10/10 for lethargy and reduced responsiveness. However, when she was taken there, she was found to be at her baseline and no work-up was done. Today she is accompanied by caretaker from her program and . Per caretaker, she is doing well and at her baseline. Patient reports a headache that started from the morning. Patient often grinds her teeth. Denies vision changes, dizziness, or any new neuro deficits. Denies F/C/CP/SOB. No abdominal pain, N/V/D/C. Is due for her flu shot today which she receives every year.

## 2018-10-15 NOTE — PHYSICAL EXAM
[No Acute Distress] : no acute distress [Well-Appearing] : well-appearing [Normal Sclera/Conjunctiva] : normal sclera/conjunctiva [PERRL] : pupils equal round and reactive to light [Normal Outer Ear/Nose] : the outer ears and nose were normal in appearance [Normal Oropharynx] : the oropharynx was normal [Supple] : supple [No Lymphadenopathy] : no lymphadenopathy [No Respiratory Distress] : no respiratory distress  [No Accessory Muscle Use] : no accessory muscle use [Normal Rate] : normal rate  [Normal S1, S2] : normal S1 and S2 [Pedal Pulses Present] : the pedal pulses are present [No Edema] : there was no peripheral edema [Soft] : abdomen soft [Non Tender] : non-tender [Non-distended] : non-distended [Normal Posterior Cervical Nodes] : no posterior cervical lymphadenopathy [Normal Anterior Cervical Nodes] : no anterior cervical lymphadenopathy [No Spinal Tenderness] : no spinal tenderness [No Joint Swelling] : no joint swelling [No Rash] : no rash [No Focal Deficits] : no focal deficits [Normal Affect] : the affect was normal [Normal Mood] : the mood was normal [de-identified] : difficult to auscultate clearly as patient cooperating with commands of respiration [de-identified] : involuntary movements of extremities but can follow simple commands

## 2018-10-15 NOTE — REVIEW OF SYSTEMS
[Headache] : headache [Fever] : no fever [Chills] : no chills [Vision Problems] : no vision problems [Sore Throat] : no sore throat [Chest Pain] : no chest pain [Shortness Of Breath] : no shortness of breath [Abdominal Pain] : no abdominal pain [Nausea] : no nausea [Constipation] : no constipation [Diarrhea] : diarrhea [Vomiting] : no vomiting [Dysuria] : no dysuria [Joint Pain] : no joint pain [Skin Rash] : no skin rash [Dizziness] : no dizziness [Insomnia] : no insomnia

## 2018-10-16 ENCOUNTER — MED ADMIN CHARGE (OUTPATIENT)
Age: 38
End: 2018-10-16

## 2018-10-26 DIAGNOSIS — G80.9 CEREBRAL PALSY, UNSPECIFIED: ICD-10-CM

## 2018-10-26 DIAGNOSIS — Z23 ENCOUNTER FOR IMMUNIZATION: ICD-10-CM

## 2018-11-12 ENCOUNTER — OUTPATIENT (OUTPATIENT)
Dept: OUTPATIENT SERVICES | Facility: HOSPITAL | Age: 38
LOS: 1 days | End: 2018-11-12
Payer: MEDICARE

## 2018-11-12 ENCOUNTER — LABORATORY RESULT (OUTPATIENT)
Age: 38
End: 2018-11-12

## 2018-11-12 ENCOUNTER — APPOINTMENT (OUTPATIENT)
Dept: INTERNAL MEDICINE | Facility: CLINIC | Age: 38
End: 2018-11-12
Payer: MEDICARE

## 2018-11-12 VITALS
BODY MASS INDEX: 21.73 KG/M2 | WEIGHT: 115 LBS | SYSTOLIC BLOOD PRESSURE: 110 MMHG | DIASTOLIC BLOOD PRESSURE: 70 MMHG | HEART RATE: 81 BPM | OXYGEN SATURATION: 98 %

## 2018-11-12 DIAGNOSIS — R25.2 CRAMP AND SPASM: ICD-10-CM

## 2018-11-12 DIAGNOSIS — G80.9 CEREBRAL PALSY, UNSPECIFIED: ICD-10-CM

## 2018-11-12 DIAGNOSIS — I10 ESSENTIAL (PRIMARY) HYPERTENSION: ICD-10-CM

## 2018-11-12 DIAGNOSIS — D55.0 ANEMIA DUE TO GLUCOSE-6-PHOSPHATE DEHYDROGENASE [G6PD] DEFICIENCY: ICD-10-CM

## 2018-11-12 DIAGNOSIS — J30.9 ALLERGIC RHINITIS, UNSPECIFIED: ICD-10-CM

## 2018-11-12 DIAGNOSIS — G40.909 EPILEPSY, UNSPECIFIED, NOT INTRACTABLE, WITHOUT STATUS EPILEPTICUS: ICD-10-CM

## 2018-11-12 PROCEDURE — 99214 OFFICE O/P EST MOD 30 MIN: CPT | Mod: GC

## 2018-11-12 PROCEDURE — G0463: CPT

## 2018-11-12 PROCEDURE — 86480 TB TEST CELL IMMUN MEASURE: CPT

## 2018-11-12 RX ORDER — INFLUENZA A VIRUS A/CALIFORNIA/7/2009 X-181 (H1N1) ANTIGEN (PROPIOLACTONE INACTIVATED), INFLUENZA A VIRUS A/TEXAS/50/2012 X-223 (H3N2) ANTIGEN (PROPIOLACTONE INACTIVATED), AND INFLUENZA B VIRUS B/MASSACHUSETTS/2/2012 BX-51B ANTIGEN (PROPIOLACTONE INACTIVATED) 15; 15; 15 UG/.5ML; UG/.5ML; UG/.5ML
INJECTION, SUSPENSION INTRAMUSCULAR
Qty: 1 | Refills: 0 | Status: DISCONTINUED | OUTPATIENT
Start: 2018-10-09 | End: 2018-11-12

## 2018-11-12 RX ORDER — INFLUENZA A VIRUS A/CALIFORNIA/7/2009 X-181 (H1N1) ANTIGEN (PROPIOLACTONE INACTIVATED), INFLUENZA A VIRUS A/TEXAS/50/2012 X-223 (H3N2) ANTIGEN (PROPIOLACTONE INACTIVATED), AND INFLUENZA B VIRUS B/MASSACHUSETTS/2/2012 BX-51B ANTIGEN (PROPIOLACTONE INACTIVATED) 15; 15; 15 UG/.5ML; UG/.5ML; UG/.5ML
INJECTION, SUSPENSION INTRAMUSCULAR
Qty: 1 | Refills: 0 | Status: DISCONTINUED | COMMUNITY
Start: 2018-10-09 | End: 2018-11-12

## 2018-11-12 RX ORDER — INFLUENZA A VIRUS A/VICTORIA/2570/2019 IVR-215 (H1N1) ANTIGEN (FORMALDEHYDE INACTIVATED), INFLUENZA A VIRUS A/TASMANIA/503/2020 IVR-221 (H3N2) ANTIGEN (FORMALDEHYDE INACTIVATED), INFLUENZA B VIRUS B/PHUKET/3073/2013 ANTIGEN (FORMALDEHYDE INACTIVATED), AND INFLUENZA B VIRUS B/WASHINGTON/02/2019 ANTIGEN (FORMALDEHYDE INACTIVATED) 15; 15; 15; 15 UG/.5ML; UG/.5ML; UG/.5ML; UG/.5ML
INJECTION, SUSPENSION INTRAMUSCULAR
Qty: 1 | Refills: 0 | Status: DISCONTINUED | COMMUNITY
Start: 2018-09-25 | End: 2018-11-12

## 2018-11-12 NOTE — HISTORY OF PRESENT ILLNESS
[FreeTextEntry1] : 38F presents for annual exam [de-identified] : Gilda Dunn ([phone]) who is caregiver from [facility] provides ASL translation and hx w/ forms \par \par 39 yo F with h/o CP w/ mental disability/spasticity/ chronic choreiform movement, seizure disorder on VPA, deafness, & G6PD  deficiency presents to clinic for annual exam. Patient reports feeling well having no pain or discomfort. No report of fever, chills, CP, SOB, cough, recent illness, n/v/d. Patient lives at group home with aides which support most IADL and some ADL and is reported to ambulate without need of assist device. Meals are prepared by home and are reported health with frequent vegetable based dishes. Per chart most recently evaluated by neurology w/ screening labs wnl in Aug2018 (CBC, CMP, Valproic Acid). \par \par LMP: normal 1mo, no contraceptives\par

## 2018-11-12 NOTE — REVIEW OF SYSTEMS
[Fever] : no fever [Night Sweats] : no night sweats [Discharge] : no discharge [Vision Problems] : no vision problems [Nasal Discharge] : no nasal discharge [Sore Throat] : no sore throat [Chest Pain] : no chest pain [Palpitations] : no palpitations [Shortness Of Breath] : no shortness of breath [Cough] : no cough [Abdominal Pain] : no abdominal pain [Diarrhea] : diarrhea [Vomiting] : no vomiting [Dysuria] : no dysuria [Hematuria] : no hematuria [Joint Stiffness] : no joint stiffness [Joint Swelling] : no joint swelling [Itching] : no itching [Skin Rash] : no skin rash [Headache] : no headache [Fainting] : no fainting [Anxiety] : no anxiety [Depression] : no depression

## 2018-11-12 NOTE — ASSESSMENT
[FreeTextEntry1] : 39 yo F with h/o CP w/ mental disability/spasticity/ chronic choreiform movement, seizure disorder on VPA, deafness, & G6PD  deficiency presents to clinic for annual exam.\par \par #CP w/ mental disability/spasticity/chronic choreiform movement\par - Neuro note appreciated\par - c/w clonazepam/valproic acid (Rx by Neuro), sertraline/olanzapine (Rx by Psych)\par - will complete annual forms for group home. Patient requires updated [employer] for group home which will send.\par \par #Seizure d/o\par -continue w/ clonazepam and valproic acid. No seizure reported. Neuro note appreciated\par - CMP Aug2018 wnl, cbc wnl\par \par #G6PD deficiency\par -continue folate supplementation \par \par #Allergic Rhinitis\par - resent Rx for fluticasone prn\par \par #HCM: \par breast ca screen: patient with breast US BIRADS-1 May 2018, can consider repeat within 2-3 year w/ shared decision making. no reported hx of breast ca.\par Pap Smear: wnl w/ neg HP Nov2017, repeat in per gyn\par Tdap: Oct2010, repeat 2020\par Influenza: Oct 2018\par HbA1c wnl 2015, low risk can be reevaluated on next annual\par Lipid wnl 2015, reevalaute on next annual\par HIV neg 2015 and report of no sexual acitivity\par \par RTC in 1 year

## 2018-11-12 NOTE — PAST MEDICAL HISTORY
[Menstruating] : menstruating [Approximately ___] : the LMP was approximately [unfilled] [Regular Cycle Intervals] : have been regular [Total Preg ___] : G[unfilled]

## 2018-11-12 NOTE — HEALTH RISK ASSESSMENT
[Excellent] : ~his/her~  mood as  excellent [No falls in past year] : Patient reported no falls in the past year [0] : 2) Feeling down, depressed, or hopeless: Not at all (0) [Patient reported PAP Smear was normal] : Patient reported PAP Smear was normal [] : No [EPG1Varbg] : 0 [PapSmearDate] : 11/17 [HIVDate] : 11/15 [HIVComments] : negative [FreeTextEntry4] : Mother is decision maker: Jeanette Riggs. [phone]

## 2018-11-12 NOTE — PHYSICAL EXAM
[No Acute Distress] : no acute distress [Well Nourished] : well nourished [PERRL] : pupils equal round and reactive to light [EOMI] : extraocular movements intact [Normal Outer Ear/Nose] : the outer ears and nose were normal in appearance [Supple] : supple [No Lymphadenopathy] : no lymphadenopathy [No Respiratory Distress] : no respiratory distress  [Clear to Auscultation] : lungs were clear to auscultation bilaterally [Normal Rate] : normal rate  [Regular Rhythm] : with a regular rhythm [Normal S1, S2] : normal S1 and S2 [No Murmur] : no murmur heard [No Edema] : there was no peripheral edema [Soft] : abdomen soft [Non Tender] : non-tender [Normal Bowel Sounds] : normal bowel sounds [No CVA Tenderness] : no CVA  tenderness [No Spinal Tenderness] : no spinal tenderness [No Joint Swelling] : no joint swelling [No Rash] : no rash [No Skin Lesions] : no skin lesions [Normal Affect] : the affect was normal [Normal Mood] : the mood was normal [de-identified] : SEAR [de-identified] : radial present b/l, PT present b/l [de-identified] : choreiform movement, deafness, alert and appropriate to questioning per , some spasticity in upper extremity noted

## 2018-11-14 LAB
GAMMA INTERFERON BACKGROUND BLD IA-ACNC: 0.02 IU/ML — SIGNIFICANT CHANGE UP
M TB IFN-G BLD-IMP: NEGATIVE — SIGNIFICANT CHANGE UP
M TB IFN-G CD4+ BCKGRND COR BLD-ACNC: 0 IU/ML — SIGNIFICANT CHANGE UP
M TB IFN-G CD4+CD8+ BCKGRND COR BLD-ACNC: 0 IU/ML — SIGNIFICANT CHANGE UP
QUANT TB PLUS MITOGEN MINUS NIL: 0.74 IU/ML — SIGNIFICANT CHANGE UP

## 2018-11-26 ENCOUNTER — APPOINTMENT (OUTPATIENT)
Dept: INTERNAL MEDICINE | Facility: CLINIC | Age: 38
End: 2018-11-26

## 2018-12-06 ENCOUNTER — MEDICATION RENEWAL (OUTPATIENT)
Age: 38
End: 2018-12-06

## 2018-12-13 ENCOUNTER — OUTPATIENT (OUTPATIENT)
Dept: OUTPATIENT SERVICES | Facility: HOSPITAL | Age: 38
LOS: 1 days | End: 2018-12-13
Payer: MEDICARE

## 2018-12-13 DIAGNOSIS — K08.9 DISORDER OF TEETH AND SUPPORTING STRUCTURES, UNSPECIFIED: ICD-10-CM

## 2018-12-13 PROCEDURE — D0120: CPT

## 2018-12-13 PROCEDURE — D1110: CPT

## 2018-12-14 DIAGNOSIS — Z01.20 ENCOUNTER FOR DENTAL EXAMINATION AND CLEANING WITHOUT ABNORMAL FINDINGS: ICD-10-CM

## 2018-12-18 ENCOUNTER — APPOINTMENT (OUTPATIENT)
Dept: OBGYN | Facility: CLINIC | Age: 38
End: 2018-12-18
Payer: MEDICARE

## 2018-12-18 ENCOUNTER — OUTPATIENT (OUTPATIENT)
Dept: OUTPATIENT SERVICES | Facility: HOSPITAL | Age: 38
LOS: 1 days | End: 2018-12-18
Payer: MEDICARE

## 2018-12-18 VITALS — SYSTOLIC BLOOD PRESSURE: 110 MMHG | WEIGHT: 122 LBS | DIASTOLIC BLOOD PRESSURE: 62 MMHG | BODY MASS INDEX: 23.05 KG/M2

## 2018-12-18 DIAGNOSIS — N76.0 ACUTE VAGINITIS: ICD-10-CM

## 2018-12-18 DIAGNOSIS — N63.0 UNSPECIFIED LUMP IN UNSPECIFIED BREAST: ICD-10-CM

## 2018-12-18 PROCEDURE — G0101: CPT | Mod: NC

## 2018-12-18 PROCEDURE — G0463: CPT

## 2018-12-20 DIAGNOSIS — N63.0 UNSPECIFIED LUMP IN UNSPECIFIED BREAST: ICD-10-CM

## 2018-12-20 DIAGNOSIS — Z01.419 ENCOUNTER FOR GYNECOLOGICAL EXAMINATION (GENERAL) (ROUTINE) WITHOUT ABNORMAL FINDINGS: ICD-10-CM

## 2019-01-03 ENCOUNTER — MEDICATION RENEWAL (OUTPATIENT)
Age: 39
End: 2019-01-03

## 2019-01-29 ENCOUNTER — APPOINTMENT (OUTPATIENT)
Dept: NEUROLOGY | Facility: HOSPITAL | Age: 39
End: 2019-01-29

## 2019-01-29 ENCOUNTER — OUTPATIENT (OUTPATIENT)
Dept: OUTPATIENT SERVICES | Facility: HOSPITAL | Age: 39
LOS: 1 days | End: 2019-01-29
Payer: MEDICARE

## 2019-01-29 VITALS
RESPIRATION RATE: 14 BRPM | SYSTOLIC BLOOD PRESSURE: 104 MMHG | DIASTOLIC BLOOD PRESSURE: 70 MMHG | HEART RATE: 81 BPM | WEIGHT: 121 LBS | BODY MASS INDEX: 22.84 KG/M2 | HEIGHT: 61 IN

## 2019-01-29 DIAGNOSIS — R56.9 UNSPECIFIED CONVULSIONS: ICD-10-CM

## 2019-01-29 PROCEDURE — G0463: CPT

## 2019-01-29 NOTE — REVIEW OF SYSTEMS
[As Noted in HPI] : as noted in HPI [Negative] : Musculoskeletal [Fever] : no fever [Chills] : no chills [Confused or Disoriented] : no confusion [Change In Personality] : no personality change [Shortness Of Breath] : no shortness of breath [Abdominal Pain] : no abdominal pain

## 2019-01-29 NOTE — PHYSICAL EXAM
[Mood] : the mood was normal [PERRL With Normal Accommodation] : pupils were equal in size, round, reactive to light, with normal accommodation [Respiration, Rhythm And Depth] : normal respiratory rhythm and effort [Exaggerated Use Of Accessory Muscles For Inspiration] : no accessory muscle use [Edema] : there was no peripheral edema [] : no rash [Skin Lesions] : no skin lesions [FreeTextEntry1] : choreiform movements at baseline, able to walk independently.

## 2019-01-29 NOTE — REASON FOR VISIT
[Follow-Up: _____] : a [unfilled] follow-up visit [Formal Caregiver] : formal caregiver [Other: _____] : [unfilled] [ Service] : provided by  Service
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
## 2019-01-29 NOTE — DISCUSSION/SUMMARY
[FreeTextEntry1] : 36 y/o F PMHx MR, CP, deafness, seizure disorder currently on Klonopin and Depakote 250/500 well controlled with no seizure activity in >16 years, no complaints.\par \par 1. CP with seizure disorder:\par \par - C/w Klonopin 0.25mg QHS and Depakote 250mg /500mg \par -Can take Motrin 400MG for headache pain \par - RTC 6 months \par - CBC, CMP, Depakote level wnl in August 2018. Will recheck at next visit with Vitamin D level.

## 2019-01-31 ENCOUNTER — RX RENEWAL (OUTPATIENT)
Age: 39
End: 2019-01-31

## 2019-02-04 ENCOUNTER — EMERGENCY (EMERGENCY)
Facility: HOSPITAL | Age: 39
LOS: 1 days | End: 2019-02-04
Attending: EMERGENCY MEDICINE
Payer: MEDICARE

## 2019-02-04 VITALS
HEART RATE: 76 BPM | SYSTOLIC BLOOD PRESSURE: 104 MMHG | TEMPERATURE: 99 F | DIASTOLIC BLOOD PRESSURE: 73 MMHG | OXYGEN SATURATION: 96 % | RESPIRATION RATE: 19 BRPM

## 2019-02-04 VITALS
DIASTOLIC BLOOD PRESSURE: 66 MMHG | SYSTOLIC BLOOD PRESSURE: 146 MMHG | OXYGEN SATURATION: 96 % | HEART RATE: 68 BPM | RESPIRATION RATE: 21 BRPM | TEMPERATURE: 99 F

## 2019-02-04 PROCEDURE — 90471 IMMUNIZATION ADMIN: CPT

## 2019-02-04 PROCEDURE — 99284 EMERGENCY DEPT VISIT MOD MDM: CPT | Mod: 25,GC

## 2019-02-04 PROCEDURE — 70450 CT HEAD/BRAIN W/O DYE: CPT

## 2019-02-04 PROCEDURE — 12001 RPR S/N/AX/GEN/TRNK 2.5CM/<: CPT

## 2019-02-04 PROCEDURE — 96376 TX/PRO/DX INJ SAME DRUG ADON: CPT | Mod: XU

## 2019-02-04 PROCEDURE — 99285 EMERGENCY DEPT VISIT HI MDM: CPT | Mod: 25

## 2019-02-04 PROCEDURE — 72125 CT NECK SPINE W/O DYE: CPT | Mod: 26

## 2019-02-04 PROCEDURE — 12001 RPR S/N/AX/GEN/TRNK 2.5CM/<: CPT | Mod: GC

## 2019-02-04 PROCEDURE — 96374 THER/PROPH/DIAG INJ IV PUSH: CPT | Mod: XU

## 2019-02-04 PROCEDURE — 72125 CT NECK SPINE W/O DYE: CPT

## 2019-02-04 PROCEDURE — 90715 TDAP VACCINE 7 YRS/> IM: CPT

## 2019-02-04 PROCEDURE — 70450 CT HEAD/BRAIN W/O DYE: CPT | Mod: 26

## 2019-02-04 RX ORDER — ACETAMINOPHEN 500 MG
650 TABLET ORAL ONCE
Qty: 0 | Refills: 0 | Status: COMPLETED | OUTPATIENT
Start: 2019-02-04 | End: 2019-02-04

## 2019-02-04 RX ORDER — TETANUS TOXOID, REDUCED DIPHTHERIA TOXOID AND ACELLULAR PERTUSSIS VACCINE, ADSORBED 5; 2.5; 8; 8; 2.5 [IU]/.5ML; [IU]/.5ML; UG/.5ML; UG/.5ML; UG/.5ML
0.5 SUSPENSION INTRAMUSCULAR ONCE
Qty: 0 | Refills: 0 | Status: COMPLETED | OUTPATIENT
Start: 2019-02-04 | End: 2019-02-04

## 2019-02-04 RX ADMIN — TETANUS TOXOID, REDUCED DIPHTHERIA TOXOID AND ACELLULAR PERTUSSIS VACCINE, ADSORBED 0.5 MILLILITER(S): 5; 2.5; 8; 8; 2.5 SUSPENSION INTRAMUSCULAR at 20:20

## 2019-02-04 RX ADMIN — Medication 1 MILLIGRAM(S): at 20:55

## 2019-02-04 RX ADMIN — Medication 650 MILLIGRAM(S): at 20:20

## 2019-02-04 RX ADMIN — Medication 1 MILLIGRAM(S): at 20:21
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
## 2019-02-04 NOTE — ED ADULT NURSE NOTE - NSIMPLEMENTINTERV_GEN_ALL_ED
Implemented All Fall Risk Interventions:  [place] to call system. Call bell, personal items and telephone within reach. Instruct patient to call for assistance. Room bathroom lighting operational. Non-slip footwear when patient is off stretcher. Physically safe environment: no spills, clutter or unnecessary equipment. Stretcher in lowest position, wheels locked, appropriate side rails in place. Provide visual cue, wrist band, yellow gown, etc. Monitor gait and stability. Monitor for mental status changes and reorient to person, place, and time. Review medications for side effects contributing to fall risk. Reinforce activity limits and safety measures with patient and family.
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
## 2019-02-04 NOTE — ED PROVIDER NOTE - NSFOLLOWUPINSTRUCTIONS_ED_ALL_ED_FT
1. Return to ED for worsening, progressive or any other concerning symptoms   2. Follow up with your primary care doctor in 2-3days  3. Sutures are dissolving, can dissolve on their own. Okay to apply ointment over the laceration  4. Take motrin 400mg every 6 hours as needed for pain

## 2019-02-04 NOTE — ED PROVIDER NOTE - NS ED ROS FT
CONSTITUTIONAL: No fevers  Eyes: no visual changes  Ears:  no ear pain  Nose: no nasal congestion  Mouth/Throat: no sore throat  Cardiovascular: No Chest pain  Respiratory: No SOB  Gastrointestinal:  no abd pain  Genitourinary: no dysuria  SKIN: Laceration  Neuro: +headache
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
## 2019-02-08 ENCOUNTER — OUTPATIENT (OUTPATIENT)
Dept: OUTPATIENT SERVICES | Facility: HOSPITAL | Age: 39
LOS: 1 days | End: 2019-02-08
Payer: MEDICARE

## 2019-02-08 ENCOUNTER — APPOINTMENT (OUTPATIENT)
Dept: INTERNAL MEDICINE | Facility: CLINIC | Age: 39
End: 2019-02-08
Payer: MEDICARE

## 2019-02-08 VITALS
HEIGHT: 61 IN | WEIGHT: 121 LBS | DIASTOLIC BLOOD PRESSURE: 70 MMHG | BODY MASS INDEX: 22.84 KG/M2 | SYSTOLIC BLOOD PRESSURE: 100 MMHG

## 2019-02-08 DIAGNOSIS — I10 ESSENTIAL (PRIMARY) HYPERTENSION: ICD-10-CM

## 2019-02-08 PROCEDURE — G0463: CPT

## 2019-02-08 PROCEDURE — 99213 OFFICE O/P EST LOW 20 MIN: CPT | Mod: GE

## 2019-02-08 NOTE — ASSESSMENT
[FreeTextEntry1] : Ms. Riggs is a 37 yo F with h/o CP w/ mental disability/spasticity/ chronic choreiform movement, seizure disorder on VPA, deafness, & G6PD deficiency presents to clinic for ED visit follow up.\par \par # Forehead laceration s/p suture.\par - Dissolvable suture in place. Well healing. No neurologic sequelae. Function at baseline.\par - Return to program letter provided.\par - continue pain management with OTC medications\par - Avoid pressures around the wound to prevent dehiscence.\par - f/u in 6 months for annual CPE.\par \par

## 2019-02-08 NOTE — REVIEW OF SYSTEMS
[Fever] : no fever [Chills] : no chills [Night Sweats] : no night sweats [Discharge] : no discharge [Vision Problems] : no vision problems [Earache] : no earache [Hearing Loss] : no hearing loss [Nosebleed] : no nosebleeds [Nasal Discharge] : no nasal discharge [Chest Pain] : no chest pain [Orthopnea] : no orthopnea [Shortness Of Breath] : no shortness of breath [Abdominal Pain] : no abdominal pain [Nausea] : no nausea [Vomiting] : no vomiting [Dysuria] : no dysuria [Joint Pain] : no joint pain [Itching] : no itching [Headache] : no headache [Insomnia] : no insomnia [Anxiety] : no anxiety [Depression] : no depression [Easy Bleeding] : no easy bleeding [FreeTextEntry4] : headache on laceration.

## 2019-02-08 NOTE — PHYSICAL EXAM
[No Acute Distress] : no acute distress [Well Nourished] : well nourished [Normal Sclera/Conjunctiva] : normal sclera/conjunctiva [PERRL] : pupils equal round and reactive to light [No JVD] : no jugular venous distention [Supple] : supple [No Respiratory Distress] : no respiratory distress  [Clear to Auscultation] : lungs were clear to auscultation bilaterally [No Accessory Muscle Use] : no accessory muscle use [Normal Rate] : normal rate  [Regular Rhythm] : with a regular rhythm [Normal S1, S2] : normal S1 and S2 [No Murmur] : no murmur heard [Pedal Pulses Present] : the pedal pulses are present [No Edema] : there was no peripheral edema [Soft] : abdomen soft [Non Tender] : non-tender [Non-distended] : non-distended [Normal Bowel Sounds] : normal bowel sounds [No CVA Tenderness] : no CVA  tenderness [No Spinal Tenderness] : no spinal tenderness [No Joint Swelling] : no joint swelling [Grossly Normal Strength/Tone] : grossly normal strength/tone [No Rash] : no rash [Normal Affect] : the affect was normal [Normal Insight/Judgement] : insight and judgment were intact [de-identified] : Laceration on forehead 3~4 cm. Stitched and well healing. no dehiscence, erythema, or drainage. Tender to palpation. [de-identified] : EOMI (not compliant but spontaneously looks around in all directions). R facial droop. [de-identified] : choreiform movements at baseline.  [de-identified] : Orineted to person/place/time. nonverbal at baseline. Communicates via ASL. Answers simple questions w/ repeatition. Sensory intact. 2+ reflexes b/l. Coordination difficult to assess due to choreiform movements.
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
## 2019-02-22 DIAGNOSIS — S01.91XA LACERATION WITHOUT FOREIGN BODY OF UNSPECIFIED PART OF HEAD, INITIAL ENCOUNTER: ICD-10-CM

## 2019-02-26 ENCOUNTER — RX RENEWAL (OUTPATIENT)
Age: 39
End: 2019-02-26

## 2019-02-28 ENCOUNTER — RX RENEWAL (OUTPATIENT)
Age: 39
End: 2019-02-28

## 2019-03-13 PROBLEM — G80.9 CEREBRAL PALSY, UNSPECIFIED: Chronic | Status: ACTIVE | Noted: 2019-02-04

## 2019-03-26 ENCOUNTER — RX RENEWAL (OUTPATIENT)
Age: 39
End: 2019-03-26

## 2019-04-07 ENCOUNTER — RX RENEWAL (OUTPATIENT)
Age: 39
End: 2019-04-07

## 2019-04-23 ENCOUNTER — RX RENEWAL (OUTPATIENT)
Age: 39
End: 2019-04-23

## 2019-04-26 ENCOUNTER — RX RENEWAL (OUTPATIENT)
Age: 39
End: 2019-04-26

## 2019-05-03 ENCOUNTER — RX RENEWAL (OUTPATIENT)
Age: 39
End: 2019-05-03

## 2019-05-07 ENCOUNTER — MEDICATION RENEWAL (OUTPATIENT)
Age: 39
End: 2019-05-07

## 2019-05-08 ENCOUNTER — APPOINTMENT (OUTPATIENT)
Dept: MAMMOGRAPHY | Facility: IMAGING CENTER | Age: 39
End: 2019-05-08

## 2019-05-14 ENCOUNTER — MEDICATION RENEWAL (OUTPATIENT)
Age: 39
End: 2019-05-14

## 2019-05-14 ENCOUNTER — RX RENEWAL (OUTPATIENT)
Age: 39
End: 2019-05-14

## 2019-06-14 ENCOUNTER — OUTPATIENT (OUTPATIENT)
Dept: OUTPATIENT SERVICES | Facility: HOSPITAL | Age: 39
LOS: 1 days | End: 2019-06-14
Payer: MEDICARE

## 2019-06-14 ENCOUNTER — OTHER (OUTPATIENT)
Age: 39
End: 2019-06-14

## 2019-06-14 ENCOUNTER — APPOINTMENT (OUTPATIENT)
Dept: ULTRASOUND IMAGING | Facility: IMAGING CENTER | Age: 39
End: 2019-06-14
Payer: MEDICARE

## 2019-06-14 DIAGNOSIS — N63.0 UNSPECIFIED LUMP IN UNSPECIFIED BREAST: ICD-10-CM

## 2019-06-14 PROCEDURE — 76641 ULTRASOUND BREAST COMPLETE: CPT

## 2019-06-14 PROCEDURE — 76641 ULTRASOUND BREAST COMPLETE: CPT | Mod: 26,50

## 2019-06-18 ENCOUNTER — MEDICATION RENEWAL (OUTPATIENT)
Age: 39
End: 2019-06-18

## 2019-06-27 ENCOUNTER — OUTPATIENT (OUTPATIENT)
Dept: OUTPATIENT SERVICES | Facility: HOSPITAL | Age: 39
LOS: 1 days | End: 2019-06-27
Payer: MEDICARE

## 2019-06-27 DIAGNOSIS — K08.9 DISORDER OF TEETH AND SUPPORTING STRUCTURES, UNSPECIFIED: ICD-10-CM

## 2019-06-27 PROCEDURE — D1110: CPT

## 2019-06-27 PROCEDURE — D0120: CPT

## 2019-06-28 ENCOUNTER — RX RENEWAL (OUTPATIENT)
Age: 39
End: 2019-06-28

## 2019-07-02 DIAGNOSIS — Z01.20 ENCOUNTER FOR DENTAL EXAMINATION AND CLEANING WITHOUT ABNORMAL FINDINGS: ICD-10-CM

## 2019-07-11 ENCOUNTER — RX RENEWAL (OUTPATIENT)
Age: 39
End: 2019-07-11

## 2019-07-16 ENCOUNTER — RX RENEWAL (OUTPATIENT)
Age: 39
End: 2019-07-16

## 2019-08-05 ENCOUNTER — RX RENEWAL (OUTPATIENT)
Age: 39
End: 2019-08-05

## 2019-08-13 ENCOUNTER — MEDICATION RENEWAL (OUTPATIENT)
Age: 39
End: 2019-08-13

## 2019-08-27 ENCOUNTER — APPOINTMENT (OUTPATIENT)
Dept: NEUROLOGY | Facility: HOSPITAL | Age: 39
End: 2019-08-27

## 2019-08-27 ENCOUNTER — OUTPATIENT (OUTPATIENT)
Dept: OUTPATIENT SERVICES | Facility: HOSPITAL | Age: 39
LOS: 1 days | End: 2019-08-27
Payer: MEDICARE

## 2019-08-27 VITALS
BODY MASS INDEX: 21.34 KG/M2 | HEART RATE: 78 BPM | SYSTOLIC BLOOD PRESSURE: 102 MMHG | RESPIRATION RATE: 16 BRPM | DIASTOLIC BLOOD PRESSURE: 64 MMHG | WEIGHT: 113 LBS | HEIGHT: 61 IN

## 2019-08-27 DIAGNOSIS — R56.9 UNSPECIFIED CONVULSIONS: ICD-10-CM

## 2019-08-27 DIAGNOSIS — G80.9 CEREBRAL PALSY, UNSPECIFIED: ICD-10-CM

## 2019-08-27 DIAGNOSIS — G40.909 EPILEPSY, UNSPECIFIED, NOT INTRACTABLE, WITHOUT STATUS EPILEPTICUS: ICD-10-CM

## 2019-08-27 PROCEDURE — G0463: CPT

## 2019-08-27 PROCEDURE — 36415 COLL VENOUS BLD VENIPUNCTURE: CPT

## 2019-08-27 PROCEDURE — 80053 COMPREHEN METABOLIC PANEL: CPT

## 2019-08-27 NOTE — PHYSICAL EXAM
[FreeTextEntry1] : alert and oriented\par CN II to XII: unable\par Motor: b/l contractures noted in UE; b/l strength 5/5 b/l UE and LE; DTR 3+ b/l UE; 2+ b/l LE; Plantar upgoing b/l\par Sensory: intact b/l UE and LE\par Gait: shuffling; no crutches needed.

## 2019-08-27 NOTE — HISTORY OF PRESENT ILLNESS
[FreeTextEntry1] : 39yo F with CP, MR, deafness and mutism, seizure disorder; last saw neurology in Jan 2019. No seizures since last visit; no ER visit.\par \par ROS: neg\par \par Meds: Valproic acid 250mg - 500mg\par Klonipin 0.25mg qHS\par Sertraline 100mg OD and Olanzapine qHS\par

## 2019-08-27 NOTE — ASSESSMENT
[FreeTextEntry1] : 37yo F with seizures, CP, MR, deafness and mutism; last saw neurology in 2019; no new symptoms since previous visit; no ER visits; no seizures; exam unchanged from previous visit.\par \par Recommendations: \par Conitnue Valproic Acid and Klonipin as is\par CBC and CMP today\par Sertraline and Olanzapine per psychiatrist\par Follow up in 6 mo.

## 2019-09-03 ENCOUNTER — RESULT REVIEW (OUTPATIENT)
Age: 39
End: 2019-09-03

## 2019-09-10 ENCOUNTER — RX RENEWAL (OUTPATIENT)
Age: 39
End: 2019-09-10

## 2019-09-27 ENCOUNTER — RX RENEWAL (OUTPATIENT)
Age: 39
End: 2019-09-27

## 2019-10-22 ENCOUNTER — RX RENEWAL (OUTPATIENT)
Age: 39
End: 2019-10-22

## 2019-11-19 ENCOUNTER — RX RENEWAL (OUTPATIENT)
Age: 39
End: 2019-11-19

## 2019-11-25 ENCOUNTER — LABORATORY RESULT (OUTPATIENT)
Age: 39
End: 2019-11-25

## 2019-11-25 ENCOUNTER — APPOINTMENT (OUTPATIENT)
Dept: INTERNAL MEDICINE | Facility: CLINIC | Age: 39
End: 2019-11-25
Payer: MEDICARE

## 2019-11-25 ENCOUNTER — OUTPATIENT (OUTPATIENT)
Dept: OUTPATIENT SERVICES | Facility: HOSPITAL | Age: 39
LOS: 1 days | End: 2019-11-25
Payer: MEDICARE

## 2019-11-25 VITALS
SYSTOLIC BLOOD PRESSURE: 112 MMHG | HEIGHT: 61 IN | WEIGHT: 111 LBS | BODY MASS INDEX: 20.96 KG/M2 | DIASTOLIC BLOOD PRESSURE: 70 MMHG

## 2019-11-25 VITALS — TEMPERATURE: 98.9 F | OXYGEN SATURATION: 96 % | HEART RATE: 76 BPM

## 2019-11-25 DIAGNOSIS — Z01.419 ENCOUNTER FOR GYNECOLOGICAL EXAMINATION (GENERAL) (ROUTINE) W/OUT ABNORMAL FINDINGS: ICD-10-CM

## 2019-11-25 DIAGNOSIS — J30.9 ALLERGIC RHINITIS, UNSPECIFIED: ICD-10-CM

## 2019-11-25 DIAGNOSIS — Z87.898 PERSONAL HISTORY OF OTHER SPECIFIED CONDITIONS: ICD-10-CM

## 2019-11-25 DIAGNOSIS — S01.91XA LACERATION W/OUT FOREIGN BODY OF UNSPECIFIED PART OF HEAD, INITIAL ENCOUNTER: ICD-10-CM

## 2019-11-25 DIAGNOSIS — Z92.29 PERSONAL HISTORY OF OTHER DRUG THERAPY: ICD-10-CM

## 2019-11-25 DIAGNOSIS — I10 ESSENTIAL (PRIMARY) HYPERTENSION: ICD-10-CM

## 2019-11-25 PROCEDURE — G0008: CPT

## 2019-11-25 PROCEDURE — 99214 OFFICE O/P EST MOD 30 MIN: CPT | Mod: GC

## 2019-11-25 PROCEDURE — 90732 PPSV23 VACC 2 YRS+ SUBQ/IM: CPT

## 2019-11-25 PROCEDURE — 83036 HEMOGLOBIN GLYCOSYLATED A1C: CPT

## 2019-11-25 PROCEDURE — 80053 COMPREHEN METABOLIC PANEL: CPT

## 2019-11-25 PROCEDURE — G0009: CPT

## 2019-11-25 PROCEDURE — 86480 TB TEST CELL IMMUN MEASURE: CPT

## 2019-11-25 PROCEDURE — 85027 COMPLETE CBC AUTOMATED: CPT

## 2019-11-25 PROCEDURE — G0463: CPT | Mod: 25

## 2019-11-25 PROCEDURE — 80061 LIPID PANEL: CPT

## 2019-11-25 PROCEDURE — 90688 IIV4 VACCINE SPLT 0.5 ML IM: CPT

## 2019-11-26 LAB
ALBUMIN SERPL ELPH-MCNC: 4.6 G/DL — SIGNIFICANT CHANGE UP (ref 3.3–5)
ALP SERPL-CCNC: 67 U/L — SIGNIFICANT CHANGE UP (ref 40–120)
ALT FLD-CCNC: 11 U/L — SIGNIFICANT CHANGE UP (ref 10–45)
ANION GAP SERPL CALC-SCNC: 12 MMOL/L — SIGNIFICANT CHANGE UP (ref 5–17)
AST SERPL-CCNC: 11 U/L — SIGNIFICANT CHANGE UP (ref 10–40)
BILIRUB SERPL-MCNC: 0.4 MG/DL — SIGNIFICANT CHANGE UP (ref 0.2–1.2)
BUN SERPL-MCNC: 12 MG/DL — SIGNIFICANT CHANGE UP (ref 7–23)
CALCIUM SERPL-MCNC: 9.6 MG/DL — SIGNIFICANT CHANGE UP (ref 8.4–10.5)
CHLORIDE SERPL-SCNC: 101 MMOL/L — SIGNIFICANT CHANGE UP (ref 96–108)
CHOLEST SERPL-MCNC: 156 MG/DL — SIGNIFICANT CHANGE UP (ref 10–199)
CO2 SERPL-SCNC: 28 MMOL/L — SIGNIFICANT CHANGE UP (ref 22–31)
CREAT SERPL-MCNC: 0.41 MG/DL — LOW (ref 0.5–1.3)
ESTIMATED AVERAGE GLUCOSE: 82 MG/DL — SIGNIFICANT CHANGE UP (ref 68–114)
GLUCOSE SERPL-MCNC: 87 MG/DL — SIGNIFICANT CHANGE UP (ref 70–99)
HBA1C BLD-MCNC: 4.5 % — SIGNIFICANT CHANGE UP (ref 4–5.6)
HCT VFR BLD CALC: 43.7 % — SIGNIFICANT CHANGE UP (ref 34.5–45)
HDLC SERPL-MCNC: 63 MG/DL — SIGNIFICANT CHANGE UP
HGB BLD-MCNC: 13.7 G/DL — SIGNIFICANT CHANGE UP (ref 11.5–15.5)
LIPID PNL WITH DIRECT LDL SERPL: 77 MG/DL — SIGNIFICANT CHANGE UP
MCHC RBC-ENTMCNC: 31.4 GM/DL — LOW (ref 32–36)
MCHC RBC-ENTMCNC: 32.1 PG — SIGNIFICANT CHANGE UP (ref 27–34)
MCV RBC AUTO: 102.3 FL — HIGH (ref 80–100)
PLATELET # BLD AUTO: 209 K/UL — SIGNIFICANT CHANGE UP (ref 150–400)
POTASSIUM SERPL-MCNC: 4.1 MMOL/L — SIGNIFICANT CHANGE UP (ref 3.5–5.3)
POTASSIUM SERPL-SCNC: 4.1 MMOL/L — SIGNIFICANT CHANGE UP (ref 3.5–5.3)
PROT SERPL-MCNC: 7.1 G/DL — SIGNIFICANT CHANGE UP (ref 6–8.3)
RBC # BLD: 4.27 M/UL — SIGNIFICANT CHANGE UP (ref 3.8–5.2)
RBC # FLD: 11.8 % — SIGNIFICANT CHANGE UP (ref 10.3–14.5)
SODIUM SERPL-SCNC: 141 MMOL/L — SIGNIFICANT CHANGE UP (ref 135–145)
TOTAL CHOLESTEROL/HDL RATIO MEASUREMENT: 2.5 RATIO — LOW (ref 3.3–7.1)
TRIGL SERPL-MCNC: 82 MG/DL — SIGNIFICANT CHANGE UP (ref 10–149)
WBC # BLD: 7.52 K/UL — SIGNIFICANT CHANGE UP (ref 3.8–10.5)
WBC # FLD AUTO: 7.52 K/UL — SIGNIFICANT CHANGE UP (ref 3.8–10.5)

## 2019-11-27 LAB
GAMMA INTERFERON BACKGROUND BLD IA-ACNC: 0.01 IU/ML — SIGNIFICANT CHANGE UP
M TB IFN-G BLD-IMP: NEGATIVE — SIGNIFICANT CHANGE UP
M TB IFN-G CD4+ BCKGRND COR BLD-ACNC: 0 IU/ML — SIGNIFICANT CHANGE UP
M TB IFN-G CD4+CD8+ BCKGRND COR BLD-ACNC: 0 IU/ML — SIGNIFICANT CHANGE UP
QUANT TB PLUS MITOGEN MINUS NIL: 2.98 IU/ML — SIGNIFICANT CHANGE UP

## 2019-11-29 PROBLEM — Z92.29 HISTORY OF INFLUENZA VACCINATION: Status: RESOLVED | Noted: 2018-10-15 | Resolved: 2019-11-29

## 2019-11-29 PROBLEM — S01.91XA LACERATION OF HEAD: Status: RESOLVED | Noted: 2019-02-08 | Resolved: 2019-11-29

## 2019-11-29 PROBLEM — Z01.419 WELL WOMAN EXAM WITH ROUTINE GYNECOLOGICAL EXAM: Status: RESOLVED | Noted: 2017-10-31 | Resolved: 2019-11-29

## 2019-11-29 NOTE — HEALTH RISK ASSESSMENT
[Very Good] : ~his/her~  mood as very good [No] : No [No falls in past year] : Patient reported no falls in the past year [0] : 2) Feeling down, depressed, or hopeless: Not at all (0) [On disability] : on disability [None] : None [Single] : single [Feels Safe at Home] : Feels safe at home [] : No [de-identified] : Eats balanced diet [de-identified] : Participates in various activities at her group home [[id number]] : 0 [Sexually Active] : not sexually active [Change in mental status noted] : No change in mental status noted [High Risk Behavior] : no high risk behavior [Reports changes in hearing] : Reports no changes in hearing [Reports changes in vision] : Reports no changes in vision [PapSmearComments] : Negative [PapSmearDate] : 10/17 [Reports changes in dental health] : Reports no changes in dental health [HIVDate] : 11/15 [de-identified] : Lives in group home [HIVComments] : Negative [de-identified] : Requires assistance with bathing. Able to dress and feed herself, use bathroom, and ambulate independently.

## 2019-11-29 NOTE — HISTORY OF PRESENT ILLNESS
[ Service] : provided by  Service [Formal Caregiver] : formal caregiver [FreeTextEntry1] : 39F with cerebral palsy with associated mental disability/spasticity/seizure disorder, congenital deafness, and G6PD deficiency who presents for CPE.  [FreeTextEntry2] : Regina Delgado [TWNoteComboBox1] : American Sign Language [de-identified] : Has been doing well since last visit in February 2019. Per her caregiver, she has been in her usual state of health and enjoys living at her group home. Has mother and brother who visit at least once per week. Has many friends at her group home and participates in various activities. Appetite has been normal. Requires assistance with bathing but otherwise independent in ADLs, dependent in most IADLs. \par \par She is followed by Neurology (law saw in August 2019) for her seizure disorder and has not had any recent seizures. Takes her antiepileptics as prescribed. \par \par She is followed by a psychiatrist (outside our system) who prescribes her olanzapine and sertraline for mood problems. Mood has been stable. \par \par Formal caregiver is Gilda Dunn from [facility] ([phone]).

## 2019-11-29 NOTE — REVIEW OF SYSTEMS
[Fever] : no fever [Recent Change In Weight] : ~T no recent weight change [Nasal Discharge] : no nasal discharge [Vision Problems] : no vision problems [Chills] : no chills [Sore Throat] : no sore throat [Chest Pain] : no chest pain [Palpitations] : no palpitations [Lower Ext Edema] : no lower extremity edema [Cough] : no cough [Shortness Of Breath] : no shortness of breath [Nausea] : no nausea [Constipation] : no constipation [Abdominal Pain] : no abdominal pain [Incontinence] : no incontinence [Dysuria] : no dysuria [Diarrhea] : diarrhea [Vomiting] : no vomiting [Joint Pain] : no joint pain [Back Pain] : no back pain [Frequency] : no frequency [Skin Rash] : no skin rash [Dizziness] : no dizziness [Headache] : no headache [Anxiety] : no anxiety [Insomnia] : no insomnia [Depression] : no depression

## 2019-11-29 NOTE — PHYSICAL EXAM
[No Acute Distress] : no acute distress [Well Nourished] : well nourished [Well Developed] : well developed [Well-Appearing] : well-appearing [Normal Sclera/Conjunctiva] : normal sclera/conjunctiva [PERRL] : pupils equal round and reactive to light [EOMI] : extraocular movements intact [Normal Outer Ear/Nose] : the outer ears and nose were normal in appearance [No Lymphadenopathy] : no lymphadenopathy [Normal Oropharynx] : the oropharynx was normal [Supple] : supple [Thyroid Normal, No Nodules] : the thyroid was normal and there were no nodules present [Clear to Auscultation] : lungs were clear to auscultation bilaterally [Normal Rate] : normal rate  [Regular Rhythm] : with a regular rhythm [Normal S1, S2] : normal S1 and S2 [No Murmur] : no murmur heard [Pedal Pulses Present] : the pedal pulses are present [No Edema] : there was no peripheral edema [Soft] : abdomen soft [Non Tender] : non-tender [No HSM] : no HSM [Non-distended] : non-distended [No Spinal Tenderness] : no spinal tenderness [Normal Bowel Sounds] : normal bowel sounds [No Joint Swelling] : no joint swelling [Grossly Normal Strength/Tone] : grossly normal strength/tone [No Rash] : no rash [Normal Affect] : the affect was normal [de-identified] : Responds to questions appropriately; spasticity noted in bilateral upper extremities (R>L); choreiform movements of upper extremities noted; strength and sensation grossly intact

## 2019-12-05 DIAGNOSIS — D75.A GLUCOSE-6-PHOSPHATE DEHYDROGENASE (G6PD) DEFICIENCY WITHOUT ANEMIA: ICD-10-CM

## 2019-12-05 DIAGNOSIS — G80.9 CEREBRAL PALSY, UNSPECIFIED: ICD-10-CM

## 2019-12-05 DIAGNOSIS — G40.909 EPILEPSY, UNSPECIFIED, NOT INTRACTABLE, WITHOUT STATUS EPILEPTICUS: ICD-10-CM

## 2019-12-05 DIAGNOSIS — J30.9 ALLERGIC RHINITIS, UNSPECIFIED: ICD-10-CM

## 2019-12-05 DIAGNOSIS — Z23 ENCOUNTER FOR IMMUNIZATION: ICD-10-CM

## 2020-01-25 ENCOUNTER — EMERGENCY (EMERGENCY)
Facility: HOSPITAL | Age: 40
LOS: 1 days | Discharge: HOME | End: 2020-01-25
Attending: EMERGENCY MEDICINE
Payer: MEDICARE

## 2020-01-25 VITALS
RESPIRATION RATE: 18 BRPM | HEART RATE: 88 BPM | WEIGHT: 110.01 LBS | DIASTOLIC BLOOD PRESSURE: 72 MMHG | TEMPERATURE: 98 F | SYSTOLIC BLOOD PRESSURE: 122 MMHG | OXYGEN SATURATION: 98 %

## 2020-01-25 PROCEDURE — 99283 EMERGENCY DEPT VISIT LOW MDM: CPT | Mod: GC

## 2020-01-25 PROCEDURE — 99283 EMERGENCY DEPT VISIT LOW MDM: CPT

## 2020-01-25 RX ORDER — ACETAMINOPHEN 500 MG
650 TABLET ORAL ONCE
Refills: 0 | Status: COMPLETED | OUTPATIENT
Start: 2020-01-25 | End: 2020-01-25

## 2020-01-25 RX ORDER — ACETAMINOPHEN 500 MG
20.3 TABLET ORAL
Qty: 324.8 | Refills: 0
Start: 2020-01-25

## 2020-01-25 RX ORDER — AMOXICILLIN 250 MG/5ML
10 SUSPENSION, RECONSTITUTED, ORAL (ML) ORAL
Qty: 200 | Refills: 0
Start: 2020-01-25 | End: 2020-02-03

## 2020-01-25 RX ADMIN — Medication 650 MILLIGRAM(S): at 20:19

## 2020-01-25 RX ADMIN — Medication 500 MILLIGRAM(S): at 20:19

## 2020-01-25 NOTE — ED PROVIDER NOTE - ATTENDING CONTRIBUTION TO CARE
39 F w/ cerebral palsy and intellectual disability presenting to the ED w/ sore throat and cough; patient non-verbal but signs and prefers to use her friend as an . Patient states that she has had sore throat for 3 days, worsening, states that she has some mild pain when she swallows but denies any difficulty eating. States no difficulty breathing. Denies any nausea/vomiting. Denies any urinary complaints. Patient has had +sick contacts, her care-taker in her group home has a cold.    PHYSICAL EXAMINATION:  VITALS REVIEWED.  VS normal  GENERALIZED APPEARANCE:  Comfortable, no acute distress, ambulating without difficulty  SKIN:  Warm, dry, no cyanosis  HEAD:  No obvious scalp lesions  EYES:  Conjunctiva pink, no icterus  ENMT:  Mucus membranes moist, no stridor, oropharynx mildly erythematous, uvula midline, no PTA, B/L TM good light reflex  NECK:  Supple, non-tender  CHEST AND RESPIRATORY:  Clear to auscultation B/L, good air entry B/L, equal chest expansion  HEART AND CARDIOVASCULAR:  Regular rate, no obvious murmur  ABDOMEN AND GI:  Soft, non-tender, non-distended.  No rebound, no guarding  EXTREMITIES:  No deformity, edema, or calf tenderness  NEURO: AAOx3, gross motor and sensory intact    Impression:  Sore throat likely pharyngitis, ?mild uvulitis; will provide antibiotics and have patient follow up with PMD, MSE reveals no emergent conditions at this time, explained to patient and friends, agreeable with plan, strict return precautions given, encouraged PO hydration.

## 2020-01-25 NOTE — ED ADULT NURSE NOTE - NSIMPLEMENTINTERV_GEN_ALL_ED
Implemented All Fall Risk Interventions:  [place] to call system. Call bell, personal items and telephone within reach. Instruct patient to call for assistance. Room bathroom lighting operational. Non-slip footwear when patient is off stretcher. Physically safe environment: no spills, clutter or unnecessary equipment. Stretcher in lowest position, wheels locked, appropriate side rails in place. Provide visual cue, wrist band, yellow gown, etc. Monitor gait and stability. Monitor for mental status changes and reorient to person, place, and time. Review medications for side effects contributing to fall risk. Reinforce activity limits and safety measures with patient and family.

## 2020-01-25 NOTE — ED ADULT NURSE NOTE - LANGUAGE ASSISTANCE NEEDED
has  from [facility] home/No-Patient/Caregiver offered and refused free interpretation services.

## 2020-01-25 NOTE — ED ADULT NURSE NOTE - PMH
CP (cerebral palsy)    CP (cerebral palsy)    Deafness congenital    MR (mental retardation)    Myopia

## 2020-01-25 NOTE — ED PROVIDER NOTE - OBJECTIVE STATEMENT
40 y/o female with hx cerebral palsy, , presents to the ED from group home c/o sore throat. Patient unable to tell for how long her throat has been sore. No documented fevers or chills. No nausea or vomiting. Has been tolerating foods and liquids. No runny nose or cough.

## 2020-01-25 NOTE — ED PROVIDER NOTE - PATIENT PORTAL LINK FT
You can access the FollowMyHealth Patient Portal offered by [facility] by registering at the following website: [email or web address]. By joining [employer]’s FollowMyHealth portal, you will also be able to view your health information using other applications (apps) compatible with our system.

## 2020-01-25 NOTE — ED ADULT TRIAGE NOTE - LANGUAGE ASSISTANCE NEEDED
No-Patient/Caregiver offered and refused free interpretation services./has  from [facility]

## 2020-01-25 NOTE — ED PROVIDER NOTE - LANGUAGE ASSISTANCE NEEDED
No-Patient/Caregiver offered and refused free interpretation services./has  from [facility]

## 2020-01-25 NOTE — ED PROVIDER NOTE - NS ED ROS FT
CONST: no fevers, no chills  EYES: no pain, no vision changes  ENT: + sore throat, no ear pain, no change in hearing  CV: no chest pain, no leg swelling  RESP: no shortness of breath, no cough  ABD: no abdominal pain, no nausea, no vomiting, no diarrhea  : no dysuria, no flank pain, no hematuria  MSK: no back pain, no extremity pain  NEURO: no headache or additional neurologic complaints  HEME: no easy bleeding  SKIN:  no rash

## 2020-01-25 NOTE — ED PROVIDER NOTE - CLINICAL SUMMARY MEDICAL DECISION MAKING FREE TEXT BOX
40 y/o female with hx of cerebral palsy and mr presenting to the ED c/o sore throat. Patient unable to provide much hx at baseline, throat mildly swollen, no fever or other URI sx, will treat for possible bacterial infection with amoxicillin.

## 2020-01-25 NOTE — ED ADULT NURSE NOTE - OBJECTIVE STATEMENT
39yF who presents to ED from group home with complaints of sore throat x2days. Pt is nonverbal and has aide and caregiver at side who was able to use sign language to communicate with her. Pt states she has a sore throat starting yesterday with slight difficulty swallowing and a nonproductive cough. Pt states she has no fevers, chills, headaches, N/V, abdominal pain, dizziness, or pain anywhere else besides throat. Pt has no ear pain. Pt went to PCP today and was not given any medication. Pt brought to ED because group home can not administer medications without a prescription. ED MD currently in room. Caregiver at bedside.

## 2020-01-25 NOTE — ED PROVIDER NOTE - PHYSICAL EXAMINATION
GENERAL: Awake, alert, NAD  HEENT: NC/AT, moist mucous membranes, PERRL  THROAT: mildly erythemtous, mild tonsillar swelling, uvula midline, no peritonsillar abscess  LUNGS: CTAB, no wheezes or crackles   CARDIAC: RRR, no m/r/g  ABDOMEN: Soft, normal BS, non tender, non distended, no rebound, no guarding  BACK: No midline spinal tenderness, no CVA tenderness  EXT: No edema, no calf tenderness, 2+ DP pulses bilaterally, no deformities.  NEURO: A&Ox3. Moving all extremities.  SKIN: Warm and dry. No rash.  PSYCH: Normal affect.

## 2020-02-18 ENCOUNTER — OUTPATIENT (OUTPATIENT)
Dept: OUTPATIENT SERVICES | Facility: HOSPITAL | Age: 40
LOS: 1 days | End: 2020-02-18
Payer: MEDICARE

## 2020-02-18 ENCOUNTER — APPOINTMENT (OUTPATIENT)
Dept: NEUROLOGY | Facility: HOSPITAL | Age: 40
End: 2020-02-18

## 2020-02-18 VITALS
HEART RATE: 86 BPM | SYSTOLIC BLOOD PRESSURE: 119 MMHG | WEIGHT: 111 LBS | RESPIRATION RATE: 14 BRPM | DIASTOLIC BLOOD PRESSURE: 76 MMHG | BODY MASS INDEX: 20.96 KG/M2 | HEIGHT: 61 IN

## 2020-02-18 DIAGNOSIS — G40.909 EPILEPSY, UNSPECIFIED, NOT INTRACTABLE, WITHOUT STATUS EPILEPTICUS: ICD-10-CM

## 2020-02-18 DIAGNOSIS — R56.9 UNSPECIFIED CONVULSIONS: ICD-10-CM

## 2020-02-18 PROCEDURE — G0463: CPT

## 2020-02-18 NOTE — PHYSICAL EXAM
[FreeTextEntry1] : Mentation: alert and oriented, following commands appropriately. \par Motor: b/l contractures noted in UE; b/l strength 5/5 b/l UE and LE; DTR 3+ b/l UE; 2+ b/l LE; Plantar upgoing b/l\par Sensory: intact b/l UE and LE\par Gait: shuffling; no crutches needed.

## 2020-02-18 NOTE — DISCUSSION/SUMMARY
[FreeTextEntry1] : 37yo F with CP, MR, deafness and mutism, seizure disorder; last saw neurology in Jan 2019. No seizures since last visit\par \par Plan\par continue Valproic acid 250mg - 500mg will re-new med\par continue Klonipin 0.25mg qHS - will renew\par continue Sertraline 100mg OD and Olanzapine qHS

## 2020-02-18 NOTE — HISTORY OF PRESENT ILLNESS
[FreeTextEntry1] : Follow up on 2/18/20: interpretation done with ASL, patient is feeling well, no complaints. She is taking her medications as prescribed in her group home. She denies pain, no issues. Has not had any seizures since last visit. \par \par Prior hx:\par 37yo F with CP, MR, deafness and mutism, seizure disorder; last saw neurology in Jan 2019. No seizures since last visit; no ER visit. Last visit 8/27/2019\par \par Meds: Valproic acid 250mg - 500mg\par Klonipin 0.25mg qHS\par Sertraline 100mg OD and Olanzapine qHS\par

## 2020-03-13 ENCOUNTER — APPOINTMENT (OUTPATIENT)
Dept: INTERNAL MEDICINE | Facility: CLINIC | Age: 40
End: 2020-03-13

## 2020-04-15 RX ORDER — CLONAZEPAM 0.25 MG/1
0.25 TABLET, ORALLY DISINTEGRATING ORAL
Qty: 30 | Refills: 3 | Status: DISCONTINUED | COMMUNITY
Start: 2019-04-07 | End: 2020-04-15

## 2020-09-29 ENCOUNTER — APPOINTMENT (OUTPATIENT)
Dept: NEUROLOGY | Facility: HOSPITAL | Age: 40
End: 2020-09-29

## 2020-10-01 ENCOUNTER — OUTPATIENT (OUTPATIENT)
Dept: OUTPATIENT SERVICES | Facility: HOSPITAL | Age: 40
LOS: 1 days | End: 2020-10-01
Payer: MEDICARE

## 2020-10-01 DIAGNOSIS — K08.9 DISORDER OF TEETH AND SUPPORTING STRUCTURES, UNSPECIFIED: ICD-10-CM

## 2020-10-01 PROCEDURE — D1110: CPT

## 2020-10-01 PROCEDURE — D0120: CPT

## 2020-10-05 DIAGNOSIS — Z01.20 ENCOUNTER FOR DENTAL EXAMINATION AND CLEANING WITHOUT ABNORMAL FINDINGS: ICD-10-CM

## 2020-11-10 ENCOUNTER — APPOINTMENT (OUTPATIENT)
Dept: NEUROLOGY | Facility: HOSPITAL | Age: 40
End: 2020-11-10

## 2020-11-10 ENCOUNTER — OUTPATIENT (OUTPATIENT)
Dept: OUTPATIENT SERVICES | Facility: HOSPITAL | Age: 40
LOS: 1 days | End: 2020-11-10
Payer: MEDICARE

## 2020-11-10 VITALS
SYSTOLIC BLOOD PRESSURE: 117 MMHG | HEIGHT: 61 IN | DIASTOLIC BLOOD PRESSURE: 56 MMHG | HEART RATE: 70 BPM | WEIGHT: 115 LBS | BODY MASS INDEX: 21.71 KG/M2 | TEMPERATURE: 96 F

## 2020-11-10 DIAGNOSIS — R56.9 UNSPECIFIED CONVULSIONS: ICD-10-CM

## 2020-11-10 DIAGNOSIS — G40.909 EPILEPSY, UNSPECIFIED, NOT INTRACTABLE, WITHOUT STATUS EPILEPTICUS: ICD-10-CM

## 2020-11-10 PROCEDURE — 85025 COMPLETE CBC W/AUTO DIFF WBC: CPT

## 2020-11-10 PROCEDURE — 80053 COMPREHEN METABOLIC PANEL: CPT

## 2020-11-10 PROCEDURE — G0463: CPT

## 2020-11-10 NOTE — HISTORY OF PRESENT ILLNESS
[FreeTextEntry1] : Follow up on 11/10/2020: interpretation done with  at bedside. Patient is feeling well. Denies any new Neurologic complaints. She is taking her medications as prescribed in her group home. She denies pain, no issues. Has not had any seizures since last visit in 2/2020\par \par Prior hx:\par 41yo F with CP, MR, deafness and mutism, seizure disorder; last saw neurology in Jan 2019. No seizures since last visit; no ER visit. Last visit 8/27/2019\par \par Meds: Valproic acid 250mg - 500mg\par Klonipin 0.25mg qHS\par Sertraline 100mg OD and Olanzapine qHS\par

## 2020-11-10 NOTE — DISCUSSION/SUMMARY
[FreeTextEntry1] : 41yo F with CP, MR, deafness and mutism, seizure disorder; last saw neurology in February 2020. No seizures since last visit. No new Neurologic complaints. \par \par Plan\par [] Continue Valproic acid 250mg - 500mg will re-new med\par [] Continue Klonipin 0.25mg qHS - will renew\par [] Continue Sertraline 100mg OD and Olanzapine qHS\par [] CBC/CMP\par [] RTC 1 year

## 2020-11-17 ENCOUNTER — NON-APPOINTMENT (OUTPATIENT)
Age: 40
End: 2020-11-17

## 2020-11-30 ENCOUNTER — LABORATORY RESULT (OUTPATIENT)
Age: 40
End: 2020-11-30

## 2020-11-30 ENCOUNTER — APPOINTMENT (OUTPATIENT)
Dept: INTERNAL MEDICINE | Facility: CLINIC | Age: 40
End: 2020-11-30

## 2020-11-30 ENCOUNTER — OUTPATIENT (OUTPATIENT)
Dept: OUTPATIENT SERVICES | Facility: HOSPITAL | Age: 40
LOS: 1 days | End: 2020-11-30
Payer: MEDICARE

## 2020-11-30 ENCOUNTER — APPOINTMENT (OUTPATIENT)
Dept: INTERNAL MEDICINE | Facility: CLINIC | Age: 40
End: 2020-11-30
Payer: MEDICARE

## 2020-11-30 VITALS
BODY MASS INDEX: 21.71 KG/M2 | DIASTOLIC BLOOD PRESSURE: 70 MMHG | HEIGHT: 61 IN | WEIGHT: 115 LBS | SYSTOLIC BLOOD PRESSURE: 100 MMHG

## 2020-11-30 DIAGNOSIS — I10 ESSENTIAL (PRIMARY) HYPERTENSION: ICD-10-CM

## 2020-11-30 PROCEDURE — G0463: CPT

## 2020-11-30 PROCEDURE — 99213 OFFICE O/P EST LOW 20 MIN: CPT | Mod: GE

## 2020-11-30 PROCEDURE — 86480 TB TEST CELL IMMUN MEASURE: CPT

## 2020-12-02 LAB
GAMMA INTERFERON BACKGROUND BLD IA-ACNC: 0.01 IU/ML — SIGNIFICANT CHANGE UP
M TB IFN-G BLD-IMP: NEGATIVE — SIGNIFICANT CHANGE UP
M TB IFN-G CD4+ BCKGRND COR BLD-ACNC: 0 IU/ML — SIGNIFICANT CHANGE UP
M TB IFN-G CD4+CD8+ BCKGRND COR BLD-ACNC: 0 IU/ML — SIGNIFICANT CHANGE UP
QUANT TB PLUS MITOGEN MINUS NIL: 0.59 IU/ML — SIGNIFICANT CHANGE UP

## 2020-12-03 NOTE — PHYSICAL EXAM
[No Acute Distress] : no acute distress [Well Nourished] : well nourished [Well Developed] : well developed [Well-Appearing] : well-appearing [Normal Sclera/Conjunctiva] : normal sclera/conjunctiva [PERRL] : pupils equal round and reactive to light [EOMI] : extraocular movements intact [Normal Outer Ear/Nose] : the outer ears and nose were normal in appearance [Normal Oropharynx] : the oropharynx was normal [No JVD] : no jugular venous distention [No Lymphadenopathy] : no lymphadenopathy [Supple] : supple [Thyroid Normal, No Nodules] : the thyroid was normal and there were no nodules present [No Respiratory Distress] : no respiratory distress  [No Accessory Muscle Use] : no accessory muscle use [Clear to Auscultation] : lungs were clear to auscultation bilaterally [Normal Rate] : normal rate  [Regular Rhythm] : with a regular rhythm [Normal S1, S2] : normal S1 and S2 [No Murmur] : no murmur heard [No Carotid Bruits] : no carotid bruits [No Abdominal Bruit] : a ~M bruit was not heard ~T in the abdomen [No Varicosities] : no varicosities [Pedal Pulses Present] : the pedal pulses are present [No Edema] : there was no peripheral edema [No Palpable Aorta] : no palpable aorta [No Extremity Clubbing/Cyanosis] : no extremity clubbing/cyanosis [Soft] : abdomen soft [Non Tender] : non-tender [Non-distended] : non-distended [No Masses] : no abdominal mass palpated [No HSM] : no HSM [Normal Bowel Sounds] : normal bowel sounds [Normal Posterior Cervical Nodes] : no posterior cervical lymphadenopathy [Normal Anterior Cervical Nodes] : no anterior cervical lymphadenopathy [No CVA Tenderness] : no CVA  tenderness [No Spinal Tenderness] : no spinal tenderness [No Joint Swelling] : no joint swelling [Grossly Normal Strength/Tone] : grossly normal strength/tone [No Rash] : no rash [Coordination Grossly Intact] : coordination grossly intact [No Focal Deficits] : no focal deficits [Normal Gait] : normal gait [Normal Affect] : the affect was normal [Normal Insight/Judgement] : insight and judgment were intact [de-identified] : Spastic activity, responsive, cheerful. Mildly contracted appearing, but baseline

## 2020-12-03 NOTE — ASSESSMENT
[FreeTextEntry1] : 39F with cerebral palsy with associated mental disability/spasticity/seizure disorder, congenital deafness, and G6PD deficiency who presents for CPE. \par \par #CP: continue depakote, klonopin\par \par #mental retardation: mood stable, no anxiety or depression at the moment. continue zyprexa, sertraline\par \par #HCM: CBC, CMP reviewed from 11/2020. WNL. A1c 4.5% and lipid panel normal last year.\par Will do quant gold for forms. Pap and co testing in 2017 negative. Next due in 2022. Flu shot from 2020 received from group [employer]. Mammo deferred to next year.\par \par Michael Weinberg\par PGY-2\par

## 2020-12-03 NOTE — HISTORY OF PRESENT ILLNESS
[FreeTextEntry1] : CPE [de-identified] : 40F with cerebral palsy with associated mental disability/spasticity/seizure disorder, congenital deafness, and G6PD deficiency who presents for CPE. Patient accompanied by formal caregiver and \par \par Has been doing well since last visit in 2019. Per her caregiver, she has been in her usual state of health and enjoys living at her group home. Has mother and brother who visit at least once per week, but has happened through window now that there are COVID protocols in place. Has many friends at her group home and participates in various activities. Appetite has been normal and eats 1/4th pieces Requires assistance with bathing but otherwise independent in ADLs, dependent in most IADLs. \par \par She is followed by Neurology (last saw in nov 2020) for her seizure disorder and has not had any recent seizures. Takes her antiepileptics/mood stablizer agents as prescribed. depakote, klonapin, sertraline, zyprexa\par \par She is followed by a psychiatrist (outside our system) who prescribes her olanzapine and sertraline for mood problems. Mood has been stable. Denies depresison, anxiety with the pandemic. Comprehends that there are germs outside.\par \par Formal caregiver is Gilda Dunn from [facility] ([phone]). \par

## 2020-12-07 DIAGNOSIS — G80.9 CEREBRAL PALSY, UNSPECIFIED: ICD-10-CM

## 2021-03-16 ENCOUNTER — APPOINTMENT (OUTPATIENT)
Dept: OBGYN | Facility: CLINIC | Age: 41
End: 2021-03-16

## 2021-04-08 ENCOUNTER — OUTPATIENT (OUTPATIENT)
Dept: OUTPATIENT SERVICES | Facility: HOSPITAL | Age: 41
LOS: 1 days | End: 2021-04-08
Payer: MEDICARE

## 2021-04-08 DIAGNOSIS — Z01.20 ENCOUNTER FOR DENTAL EXAMINATION AND CLEANING WITHOUT ABNORMAL FINDINGS: ICD-10-CM

## 2021-04-08 DIAGNOSIS — K08.9 DISORDER OF TEETH AND SUPPORTING STRUCTURES, UNSPECIFIED: ICD-10-CM

## 2021-04-08 PROCEDURE — D0120: CPT

## 2021-04-08 PROCEDURE — D1110: CPT

## 2021-04-27 ENCOUNTER — LABORATORY RESULT (OUTPATIENT)
Age: 41
End: 2021-04-27

## 2021-04-27 ENCOUNTER — APPOINTMENT (OUTPATIENT)
Dept: OBGYN | Facility: CLINIC | Age: 41
End: 2021-04-27
Payer: MEDICARE

## 2021-04-27 ENCOUNTER — OUTPATIENT (OUTPATIENT)
Dept: OUTPATIENT SERVICES | Facility: HOSPITAL | Age: 41
LOS: 1 days | End: 2021-04-27
Payer: MEDICARE

## 2021-04-27 VITALS — SYSTOLIC BLOOD PRESSURE: 100 MMHG | DIASTOLIC BLOOD PRESSURE: 62 MMHG | BODY MASS INDEX: 21.8 KG/M2 | WEIGHT: 115.38 LBS

## 2021-04-27 VITALS — TEMPERATURE: 97.5 F

## 2021-04-27 DIAGNOSIS — H91.3 DEAF NONSPEAKING, NOT ELSEWHERE CLASSIFIED: ICD-10-CM

## 2021-04-27 DIAGNOSIS — Z01.419 ENCOUNTER FOR GYNECOLOGICAL EXAMINATION (GENERAL) (ROUTINE) WITHOUT ABNORMAL FINDINGS: ICD-10-CM

## 2021-04-27 DIAGNOSIS — N76.0 ACUTE VAGINITIS: ICD-10-CM

## 2021-04-27 DIAGNOSIS — Z01.419 ENCOUNTER FOR GYNECOLOGICAL EXAMINATION (GENERAL) (ROUTINE) W/OUT ABNORMAL FINDINGS: ICD-10-CM

## 2021-04-27 DIAGNOSIS — E28.319 ASYMPTOMATIC PREMATURE MENOPAUSE: ICD-10-CM

## 2021-04-27 DIAGNOSIS — G80.9 CEREBRAL PALSY, UNSPECIFIED: ICD-10-CM

## 2021-04-27 PROCEDURE — 84443 ASSAY THYROID STIM HORMONE: CPT

## 2021-04-27 PROCEDURE — G0463: CPT

## 2021-04-27 PROCEDURE — 87491 CHLMYD TRACH DNA AMP PROBE: CPT

## 2021-04-27 PROCEDURE — 99396 PREV VISIT EST AGE 40-64: CPT | Mod: GY

## 2021-04-27 PROCEDURE — 87591 N.GONORRHOEAE DNA AMP PROB: CPT

## 2021-04-27 PROCEDURE — G0101: CPT

## 2021-04-27 PROCEDURE — 87624 HPV HI-RISK TYP POOLED RSLT: CPT

## 2021-04-27 PROCEDURE — 83001 ASSAY OF GONADOTROPIN (FSH): CPT

## 2021-04-27 NOTE — HISTORY OF PRESENT ILLNESS
[Poor] : being in poor health [Healthy Diet] : not having a healthy diet [Regular Exercise] : not exercising regularly [Weight Concerns] : no concerns with her weight [de-identified] : 11/30/20 [unknown] : the patient is unsure of the date of her LMP

## 2021-04-28 LAB
C TRACH RRNA SPEC QL NAA+PROBE: SIGNIFICANT CHANGE UP
FSH SERPL-MCNC: 120 IU/L — SIGNIFICANT CHANGE UP
HPV HIGH+LOW RISK DNA PNL CVX: SIGNIFICANT CHANGE UP
N GONORRHOEA RRNA SPEC QL NAA+PROBE: SIGNIFICANT CHANGE UP
SPECIMEN SOURCE: SIGNIFICANT CHANGE UP
T4 FREE+ TSH PNL SERPL: 0.93 UIU/ML — SIGNIFICANT CHANGE UP (ref 0.27–4.2)

## 2021-04-29 ENCOUNTER — NON-APPOINTMENT (OUTPATIENT)
Age: 41
End: 2021-04-29

## 2021-04-30 ENCOUNTER — RESULT REVIEW (OUTPATIENT)
Age: 41
End: 2021-04-30

## 2021-05-01 LAB — CYTOLOGY SPEC DOC CYTO: SIGNIFICANT CHANGE UP

## 2021-05-31 ENCOUNTER — APPOINTMENT (OUTPATIENT)
Dept: MAMMOGRAPHY | Facility: IMAGING CENTER | Age: 41
End: 2021-05-31
Payer: MEDICARE

## 2021-05-31 ENCOUNTER — RESULT REVIEW (OUTPATIENT)
Age: 41
End: 2021-05-31

## 2021-05-31 ENCOUNTER — APPOINTMENT (OUTPATIENT)
Dept: ULTRASOUND IMAGING | Facility: IMAGING CENTER | Age: 41
End: 2021-05-31
Payer: MEDICARE

## 2021-05-31 ENCOUNTER — OUTPATIENT (OUTPATIENT)
Dept: OUTPATIENT SERVICES | Facility: HOSPITAL | Age: 41
LOS: 1 days | End: 2021-05-31
Payer: MEDICARE

## 2021-05-31 DIAGNOSIS — E28.319 ASYMPTOMATIC PREMATURE MENOPAUSE: ICD-10-CM

## 2021-05-31 PROCEDURE — 76641 ULTRASOUND BREAST COMPLETE: CPT | Mod: 26,50

## 2021-05-31 PROCEDURE — 76641 ULTRASOUND BREAST COMPLETE: CPT

## 2021-10-15 ENCOUNTER — NON-APPOINTMENT (OUTPATIENT)
Age: 41
End: 2021-10-15

## 2021-10-20 ENCOUNTER — NON-APPOINTMENT (OUTPATIENT)
Age: 41
End: 2021-10-20

## 2021-10-22 ENCOUNTER — APPOINTMENT (OUTPATIENT)
Dept: ULTRASOUND IMAGING | Facility: IMAGING CENTER | Age: 41
End: 2021-10-22

## 2021-10-22 ENCOUNTER — OUTPATIENT (OUTPATIENT)
Dept: OUTPATIENT SERVICES | Facility: HOSPITAL | Age: 41
LOS: 1 days | End: 2021-10-22

## 2021-10-22 ENCOUNTER — APPOINTMENT (OUTPATIENT)
Dept: MAMMOGRAPHY | Facility: IMAGING CENTER | Age: 41
End: 2021-10-22

## 2021-10-22 DIAGNOSIS — E28.319 ASYMPTOMATIC PREMATURE MENOPAUSE: ICD-10-CM

## 2021-11-04 ENCOUNTER — OUTPATIENT (OUTPATIENT)
Dept: OUTPATIENT SERVICES | Facility: HOSPITAL | Age: 41
LOS: 1 days | End: 2021-11-04
Payer: MEDICARE

## 2021-11-04 DIAGNOSIS — K08.9 DISORDER OF TEETH AND SUPPORTING STRUCTURES, UNSPECIFIED: ICD-10-CM

## 2021-11-04 PROCEDURE — D1110: CPT

## 2021-11-04 PROCEDURE — D0120: CPT

## 2021-11-11 ENCOUNTER — NON-APPOINTMENT (OUTPATIENT)
Age: 41
End: 2021-11-11

## 2021-11-11 DIAGNOSIS — Z01.20 ENCOUNTER FOR DENTAL EXAMINATION AND CLEANING WITHOUT ABNORMAL FINDINGS: ICD-10-CM

## 2021-11-12 ENCOUNTER — APPOINTMENT (OUTPATIENT)
Dept: INTERNAL MEDICINE | Facility: CLINIC | Age: 41
End: 2021-11-12
Payer: MEDICARE

## 2021-11-12 ENCOUNTER — OUTPATIENT (OUTPATIENT)
Dept: OUTPATIENT SERVICES | Facility: HOSPITAL | Age: 41
LOS: 1 days | End: 2021-11-12
Payer: MEDICARE

## 2021-11-12 VITALS — SYSTOLIC BLOOD PRESSURE: 100 MMHG | HEART RATE: 85 BPM | OXYGEN SATURATION: 97 % | DIASTOLIC BLOOD PRESSURE: 70 MMHG

## 2021-11-12 DIAGNOSIS — I10 ESSENTIAL (PRIMARY) HYPERTENSION: ICD-10-CM

## 2021-11-12 PROCEDURE — G0463: CPT

## 2021-11-12 PROCEDURE — 99214 OFFICE O/P EST MOD 30 MIN: CPT | Mod: GC

## 2021-11-13 NOTE — PLAN
[FreeTextEntry1] : 1. Mold Exposure\par - No concern for respiratory, GI, skin issues at the moment given the patient's clinical picture today\par - Risk of further mold exposures at her residence seem to be low, per the patient's caretaker\par - No further steps to do at this moment\par \par 2. Seizure D/o\par - C/w with prescribed medications \par - F/u with Neurology\par \par 3. General Health Maintenance\par - Refills provided for Calcium, Folic Acid, Divalproex, Vitamin D3\par - Patient is up to date regarding her pap smear, breast U/S, influenza vaccinations, COVID-19 vaccination (pending booster shot)\par - RTC ASAP for CPE with annual blood work (CBC, CMP, B12, Folate)\par \par Timmy Terrazas, PGY-1, Case discussed with Dr. Wan

## 2021-11-13 NOTE — ASSESSMENT
[FreeTextEntry1] : Patient is a 40 year-old female, PMH of cerebral palsy, seizure disorder, spasm disorder, G6PD, congenital deafness, presenting to [facility] today with her caretaker from [facility], Regina Riggs ( from [facility]), with no acute concerns to follow-up regarding a possible exposure to mold about one month prior at her residence.

## 2021-11-13 NOTE — HISTORY OF PRESENT ILLNESS
[Formal Caregiver] : formal caregiver [FreeTextEntry1] : "I was exposed to mold." [de-identified] : Patient is a 40 year-old female, PMH of cerebral palsy, seizure disorder, spasm disorder, G6PD, congenital deafness, presenting to [facility] today with her caretaker from [facility], Regina Riggs ( from [facility]), with no acute concerns to follow-up regarding a possible exposure to mold about one month prior at her residence. Per the patient's caretaker, the patient, along with other residents, had possible exposures to mold after mold-like substances were found in at least one of the resident's cups. It is currently unknown whether the patient specifically drank from a cup containing said substances and the patient did not remember any details regarding her possible mold exposure. Currently, per the patient's caretaker, the facility took measures and there is no concern for more mold at the residence at this time. The patient endorses feeling her general state of health, and denies chest pain, SOB, cough, sinus issues, increased mucus in her nose/throat, rashes, fever/chills, nausea/vomiting, urinary changes, and changes in bowel movements (including diarrhea). Per the patient's caretaker, the patient received her second Pfizer COVID-19 vaccination in February 2021 and is in the process of obtaining the booster shot given her group home residence. The patient is also currently up to date regarding her pap smear and breast screening (U/S done instead of mammogram given the patient's difficulty with remaining physically still). The patient was recommended to schedule a CPE with annual blood work when scheduling allows.

## 2021-11-18 DIAGNOSIS — D75.A GLUCOSE-6-PHOSPHATE DEHYDROGENASE (G6PD) DEFICIENCY WITHOUT ANEMIA: ICD-10-CM

## 2021-11-18 DIAGNOSIS — G40.909 EPILEPSY, UNSPECIFIED, NOT INTRACTABLE, WITHOUT STATUS EPILEPTICUS: ICD-10-CM

## 2021-11-30 ENCOUNTER — NON-APPOINTMENT (OUTPATIENT)
Age: 41
End: 2021-11-30

## 2021-12-01 ENCOUNTER — OUTPATIENT (OUTPATIENT)
Dept: OUTPATIENT SERVICES | Facility: HOSPITAL | Age: 41
LOS: 1 days | End: 2021-12-01
Payer: MEDICARE

## 2021-12-01 ENCOUNTER — APPOINTMENT (OUTPATIENT)
Dept: INTERNAL MEDICINE | Facility: CLINIC | Age: 41
End: 2021-12-01
Payer: MEDICARE

## 2021-12-01 VITALS
HEART RATE: 68 BPM | WEIGHT: 111.13 LBS | HEIGHT: 61 IN | BODY MASS INDEX: 20.98 KG/M2 | OXYGEN SATURATION: 98 % | SYSTOLIC BLOOD PRESSURE: 126 MMHG | DIASTOLIC BLOOD PRESSURE: 62 MMHG

## 2021-12-01 DIAGNOSIS — Z00.00 ENCOUNTER FOR GENERAL ADULT MEDICAL EXAMINATION W/OUT ABNORMAL FINDINGS: ICD-10-CM

## 2021-12-01 DIAGNOSIS — I10 ESSENTIAL (PRIMARY) HYPERTENSION: ICD-10-CM

## 2021-12-01 PROCEDURE — G0439: CPT | Mod: GC

## 2021-12-01 RX ORDER — VITAMIN E MIXED 1000 UNIT
500-200 CAPSULE ORAL DAILY
Qty: 90 | Refills: 3 | Status: DISCONTINUED | COMMUNITY
Start: 2017-10-25 | End: 2021-12-01

## 2021-12-01 NOTE — HEALTH RISK ASSESSMENT
[No] : In the past 12 months have you used drugs other than those required for medical reasons? No [Other reason not done] : Other reason not done [Unemployed] : unemployed [] : No [de-identified] : Neurology [de-identified] : walking [de-identified] : patient's mental status [Change in mental status noted] : No change in mental status noted [de-identified] : group home [de-identified] : dependent due to developmental delay and cerebral palsy [de-identified] : dependent due to developmental delay and cerebral palsy

## 2021-12-01 NOTE — PLAN
[FreeTextEntry1] : \par PLAN AS ABOVE IN ASSESSMENT.\par \par Provided forms will be filled out once routine labs and Quantiferon are resulted. Given lag time with Quantiferon, expect forms to be ready next week.\par \par HCM:\par 1. Pap smear with HPV: UTD from April 2021.\par 2. US breast b/l in lieu of of mammo May 2021: BIRADS-1.\par 3. Vaccinations: UTD on COVID-19 x 2 and seasonal flu.\par \par RTC next year for CPE, sooner as needed.\par \par D/w Dr. Santos.

## 2021-12-01 NOTE — HISTORY OF PRESENT ILLNESS
[Other: _____] : [unfilled] [Other: ______] : provided by ARIANE [FreeTextEntry1] : CPE [de-identified] : 41 year-old female, PMH of cerebral palsy, seizure disorder, spasm disorder, G6PD, congenital deafness, who presents for CPE.\par \par Visit was abbreviated as patient arrived late to appointment, arrived at 10:30, thought they had a 10:30 AM appointment.\par \par Since last visit, no interval hospitalizations or ER visits.\par Patient feels well, without any complaints.\par Has a neurology appointment later this month.\par Patient's aide provided medical history paperwork that needs to be completed, as well as TB screening.\par Patient's aide stated that hey will run out of VPA soon and would like a refill to carry them through to the upcoming neurology appointment.\par \par ROS neg for f/c, CP, SOB, abd pain, n/v/d.

## 2021-12-01 NOTE — PHYSICAL EXAM
[No Acute Distress] : no acute distress [Well Nourished] : well nourished [Well Developed] : well developed [Well-Appearing] : well-appearing [Normal Sclera/Conjunctiva] : normal sclera/conjunctiva [EOMI] : extraocular movements intact [Normal Outer Ear/Nose] : the outer ears and nose were normal in appearance [Normal Oropharynx] : the oropharynx was normal [No Lymphadenopathy] : no lymphadenopathy [Supple] : supple [No Respiratory Distress] : no respiratory distress  [No Accessory Muscle Use] : no accessory muscle use [Clear to Auscultation] : lungs were clear to auscultation bilaterally [Normal Rate] : normal rate  [Regular Rhythm] : with a regular rhythm [Normal S1, S2] : normal S1 and S2 [No Murmur] : no murmur heard [No Edema] : there was no peripheral edema [Soft] : abdomen soft [Non Tender] : non-tender [No Masses] : no abdominal mass palpated [No HSM] : no HSM [Normal Bowel Sounds] : normal bowel sounds [Normal Posterior Cervical Nodes] : no posterior cervical lymphadenopathy [Normal Anterior Cervical Nodes] : no anterior cervical lymphadenopathy [Grossly Normal Strength/Tone] : grossly normal strength/tone [No Rash] : no rash [No Focal Deficits] : no focal deficits [de-identified] : nonverbal [de-identified] : slightly distended, abdominal obesity [de-identified] : limited skin exam [de-identified] : often with uncontrolled wavering movements

## 2021-12-02 ENCOUNTER — NON-APPOINTMENT (OUTPATIENT)
Age: 41
End: 2021-12-02

## 2021-12-02 LAB
ALBUMIN SERPL ELPH-MCNC: 4.7 G/DL
ALP BLD-CCNC: 81 U/L
ALT SERPL-CCNC: 10 U/L
ANION GAP SERPL CALC-SCNC: 13 MMOL/L
AST SERPL-CCNC: 11 U/L
BASOPHILS # BLD AUTO: 0.03 K/UL
BASOPHILS NFR BLD AUTO: 0.4 %
BILIRUB SERPL-MCNC: 0.5 MG/DL
BUN SERPL-MCNC: 13 MG/DL
CALCIUM SERPL-MCNC: 9.9 MG/DL
CHLORIDE SERPL-SCNC: 101 MMOL/L
CHOLEST SERPL-MCNC: 179 MG/DL
CO2 SERPL-SCNC: 26 MMOL/L
CREAT SERPL-MCNC: 0.47 MG/DL
EOSINOPHIL # BLD AUTO: 0.06 K/UL
EOSINOPHIL NFR BLD AUTO: 0.7 %
ESTIMATED AVERAGE GLUCOSE: 91 MG/DL
GLUCOSE SERPL-MCNC: 85 MG/DL
HBA1C MFR BLD HPLC: 4.8 %
HCT VFR BLD CALC: 42.2 %
HDLC SERPL-MCNC: 60 MG/DL
HGB BLD-MCNC: 14.2 G/DL
IMM GRANULOCYTES NFR BLD AUTO: 0.4 %
LDLC SERPL CALC-MCNC: 104 MG/DL
LYMPHOCYTES # BLD AUTO: 1.98 K/UL
LYMPHOCYTES NFR BLD AUTO: 24.5 %
MAN DIFF?: NORMAL
MCHC RBC-ENTMCNC: 31.8 PG
MCHC RBC-ENTMCNC: 33.6 GM/DL
MCV RBC AUTO: 94.4 FL
MONOCYTES # BLD AUTO: 0.65 K/UL
MONOCYTES NFR BLD AUTO: 8.1 %
NEUTROPHILS # BLD AUTO: 5.32 K/UL
NEUTROPHILS NFR BLD AUTO: 65.9 %
NONHDLC SERPL-MCNC: 119 MG/DL
PLATELET # BLD AUTO: 162 K/UL
POTASSIUM SERPL-SCNC: 4.4 MMOL/L
PROT SERPL-MCNC: 7.4 G/DL
RBC # BLD: 4.47 M/UL
RBC # FLD: 11.4 %
SODIUM SERPL-SCNC: 140 MMOL/L
TRIGL SERPL-MCNC: 75 MG/DL
TSH SERPL-ACNC: 0.49 UIU/ML
WBC # FLD AUTO: 8.07 K/UL

## 2021-12-03 DIAGNOSIS — G40.909 EPILEPSY, UNSPECIFIED, NOT INTRACTABLE, WITHOUT STATUS EPILEPTICUS: ICD-10-CM

## 2021-12-03 DIAGNOSIS — E28.319 ASYMPTOMATIC PREMATURE MENOPAUSE: ICD-10-CM

## 2021-12-03 DIAGNOSIS — G80.9 CEREBRAL PALSY, UNSPECIFIED: ICD-10-CM

## 2021-12-03 DIAGNOSIS — Z00.00 ENCOUNTER FOR GENERAL ADULT MEDICAL EXAMINATION WITHOUT ABNORMAL FINDINGS: ICD-10-CM

## 2021-12-03 DIAGNOSIS — F79 UNSPECIFIED INTELLECTUAL DISABILITIES: ICD-10-CM

## 2021-12-03 DIAGNOSIS — D75.A GLUCOSE-6-PHOSPHATE DEHYDROGENASE (G6PD) DEFICIENCY WITHOUT ANEMIA: ICD-10-CM

## 2021-12-06 ENCOUNTER — LABORATORY RESULT (OUTPATIENT)
Age: 41
End: 2021-12-06

## 2021-12-06 ENCOUNTER — NON-APPOINTMENT (OUTPATIENT)
Age: 41
End: 2021-12-06

## 2021-12-06 LAB
M TB IFN-G BLD-IMP: ABNORMAL
QUANTIFERON TB PLUS MITOGEN MINUS NIL: 0.15 IU/ML
QUANTIFERON TB PLUS NIL: 0.01 IU/ML
QUANTIFERON TB PLUS TB1 MINUS NIL: 0 IU/ML
QUANTIFERON TB PLUS TB2 MINUS NIL: 0 IU/ML

## 2021-12-06 PROCEDURE — G0439: CPT

## 2021-12-06 PROCEDURE — 36415 COLL VENOUS BLD VENIPUNCTURE: CPT

## 2021-12-06 PROCEDURE — 86480 TB TEST CELL IMMUN MEASURE: CPT

## 2021-12-08 LAB
GAMMA INTERFERON BACKGROUND BLD IA-ACNC: 0.01 IU/ML — SIGNIFICANT CHANGE UP
M TB IFN-G BLD-IMP: NEGATIVE — SIGNIFICANT CHANGE UP
M TB IFN-G CD4+ BCKGRND COR BLD-ACNC: 0 IU/ML — SIGNIFICANT CHANGE UP
M TB IFN-G CD4+CD8+ BCKGRND COR BLD-ACNC: 0 IU/ML — SIGNIFICANT CHANGE UP
QUANT TB PLUS MITOGEN MINUS NIL: 0.91 IU/ML — SIGNIFICANT CHANGE UP

## 2021-12-14 ENCOUNTER — OUTPATIENT (OUTPATIENT)
Dept: OUTPATIENT SERVICES | Facility: HOSPITAL | Age: 41
LOS: 1 days | End: 2021-12-14
Payer: MEDICARE

## 2021-12-14 ENCOUNTER — APPOINTMENT (OUTPATIENT)
Dept: NEUROLOGY | Facility: HOSPITAL | Age: 41
End: 2021-12-14

## 2021-12-14 VITALS
DIASTOLIC BLOOD PRESSURE: 82 MMHG | WEIGHT: 111 LBS | SYSTOLIC BLOOD PRESSURE: 120 MMHG | TEMPERATURE: 96.1 F | HEIGHT: 61 IN | BODY MASS INDEX: 20.96 KG/M2 | HEART RATE: 78 BPM

## 2021-12-14 DIAGNOSIS — R51.9 HEADACHE, UNSPECIFIED: ICD-10-CM

## 2021-12-14 PROCEDURE — G0463: CPT

## 2021-12-14 NOTE — DISCUSSION/SUMMARY
[FreeTextEntry1] : 41yo F with CP, MR, deafness and mutism, seizure disorder; currently stable on AED regiment, no new concern or focal neurologic deficits on exam. CBC/CMP wnl.\par \par Recommend: \par [] Continue Valproic acid 250mg - 500mg\par [] Continue Klonipin 0.25mg qHS \par [] Continue Sertraline 100mg OD and Olanzapine 2.5mg qHS\par [] CBC/CMP prior to next visit in 1 year\par [] RTC 1 year

## 2021-12-14 NOTE — HISTORY OF PRESENT ILLNESS
[FreeTextEntry1] : Follow up 12/14/21: Pt accompanied by .\par 42 yo F with CP, MR, deafness and mutism, seizure disorder who follows with Neurology clinic. Pt lives in a group home and per group home employee, she has not had a seizure in >10 years. Patient currently feeling well on /500, Klonopin 0.25mg qHS, Sertaline 100mg QD and Olanzapine 2.5mg qHS. She is compliant with all her medications. Patient denies any further events concerning for seizures or ER visits. Last seen in 2020. At baseline, pt able to ambulate independently, follows commands, nonverbal. Pt has been less agitated.\par \par Follow up on 11/10/2020: interpretation done with  at bedside. Patient is feeling well. Denies any new Neurologic complaints. She is taking her medications as prescribed in her group home. She denies pain, no issues. Has not had any seizures since last visit in 2/2020\par \par Meds:\par Valproic acid 250mg - 500mg\par Klonipin 0.25mg nightly\par Sertraline 100mg OD daily in morning\par Olanzapine 2.5 mg nightly\par

## 2021-12-14 NOTE — PHYSICAL EXAM
[FreeTextEntry1] : Mentation: alert and oriented, following commands appropriately. \par Motor: b/l contractures noted in UE; b/l strength 5/5 b/l UE and LE; DTR 3+ b/l UE; 2+ b/l LE; Plantar upgoing b/l\par Sensory: intact b/l UE and LE to LT\par Gait: ambulates independently

## 2021-12-21 DIAGNOSIS — G40.909 EPILEPSY, UNSPECIFIED, NOT INTRACTABLE, WITHOUT STATUS EPILEPTICUS: ICD-10-CM

## 2022-03-10 ENCOUNTER — OUTPATIENT (OUTPATIENT)
Dept: OUTPATIENT SERVICES | Facility: HOSPITAL | Age: 42
LOS: 1 days | End: 2022-03-10
Payer: MEDICARE

## 2022-03-10 ENCOUNTER — APPOINTMENT (OUTPATIENT)
Dept: NEUROLOGY | Facility: HOSPITAL | Age: 42
End: 2022-03-10

## 2022-03-10 VITALS
HEART RATE: 123 BPM | RESPIRATION RATE: 14 BRPM | HEIGHT: 61 IN | WEIGHT: 105 LBS | TEMPERATURE: 97.5 F | SYSTOLIC BLOOD PRESSURE: 126 MMHG | BODY MASS INDEX: 19.83 KG/M2 | DIASTOLIC BLOOD PRESSURE: 84 MMHG

## 2022-03-10 DIAGNOSIS — R56.9 UNSPECIFIED CONVULSIONS: ICD-10-CM

## 2022-03-10 PROCEDURE — G0463: CPT

## 2022-03-10 NOTE — PHYSICAL EXAM
[FreeTextEntry1] : Mentation: alert and oriented, following commands appropriately. Communicated via sign language\par Motor: b/l contractures noted in UE; spontaneous movement of extremities x 4. Choreiform movements of extremities x 4. grossly 5/5 b/l UE and LE; DTR unable to evaluate due to constant movement. \par Sensory: intact b/l UE and LE to LT\par Gait: ambulates independently.

## 2022-03-10 NOTE — HISTORY OF PRESENT ILLNESS
[FreeTextEntry1] : Patient is a 40 yo F with CP, MR, deafness and mutism, seizure disorder who follows with Neurology clinic. Patient presents for follow up visit. Group home employee and ALS  at bedside to provide collateral. No concerns or acute events since prior visit. Patient is tolerating her medications well. Patient reports no complaints. Pt lives in a group home and per group home employee, she has not had a seizure in >10 years. Patient currently feeling well on /500, Klonopin 0.25mg qHS, Sertaline 100mg QD and Olanzapine 2.5mg qHS. She is compliant with all her medications. Patient denies any further events concerning for seizures or ER visits. At baseline, pt able to ambulate independently, follows commands, nonverbal. Pt has been less agitated.\par \par \par Meds:\par Valproic acid 250mg - 500mg\par Klonipin 0.25mg nightly\par Sertraline 100mg OD daily in morning\par Olanzapine 2.5 mg nightly

## 2022-03-10 NOTE — ASSESSMENT
[FreeTextEntry1] : 39yo F with CP, MR, deafness and mutism, seizure disorder; currently stable on AED regiment, no new concern or focal neurologic deficits on exam. CBC/CMP wnl.\par \par Recommend: \par [] Continue Valproic acid 250mg - 500mg - refill sent \par [] Continue Klonipin 0.25mg qHS \par [] Continue Sertraline 100mg OD and Olanzapine 2.5mg qHS\par [] CBC/CMP prior to next visit in 1 year\par [] RTC 1 year. \par \par \par Case discussed with Dr. Jama

## 2022-03-11 DIAGNOSIS — G40.909 EPILEPSY, UNSPECIFIED, NOT INTRACTABLE, WITHOUT STATUS EPILEPTICUS: ICD-10-CM

## 2022-04-28 ENCOUNTER — APPOINTMENT (OUTPATIENT)
Dept: OBGYN | Facility: CLINIC | Age: 42
End: 2022-04-28
Payer: MEDICARE

## 2022-04-28 ENCOUNTER — NON-APPOINTMENT (OUTPATIENT)
Age: 42
End: 2022-04-28

## 2022-04-28 ENCOUNTER — OUTPATIENT (OUTPATIENT)
Dept: OUTPATIENT SERVICES | Facility: HOSPITAL | Age: 42
LOS: 1 days | End: 2022-04-28
Payer: MEDICARE

## 2022-04-28 VITALS
DIASTOLIC BLOOD PRESSURE: 80 MMHG | BODY MASS INDEX: 20.25 KG/M2 | HEIGHT: 61 IN | SYSTOLIC BLOOD PRESSURE: 120 MMHG | WEIGHT: 107.25 LBS

## 2022-04-28 DIAGNOSIS — N76.0 ACUTE VAGINITIS: ICD-10-CM

## 2022-04-28 PROCEDURE — G0463: CPT

## 2022-04-28 PROCEDURE — 99396 PREV VISIT EST AGE 40-64: CPT

## 2022-04-28 NOTE — BEGINNING OF VISIT
[Patient] : patient [Formal Caregiver] : formal caregiver [FreeTextEntry1] : formal caregiver helping with sign language inerpretation

## 2022-04-28 NOTE — PHYSICAL EXAM
[Awake] : awake [Alert] : alert [Acute Distress] : no acute distress [Mass] : no breast mass [Nipple Discharge] : no nipple discharge [Axillary LAD] : no axillary lymphadenopathy [Soft] : soft [Tender] : non tender [Distended] : not distended [H/Smegaly] : no hepatosplenomegaly [Normal] : uterus [Labia Majora] : labia major [Labia Minora] : labia minora [Erythema] : no erythema [Discharge] : no discharge [No Bleeding] : there was no active vaginal bleeding [Pap Obtained] : a Pap smear was not performed [Normal Position] : in a normal position [Uterine Adnexae] : were not tender and not enlarged

## 2022-04-28 NOTE — HISTORY OF PRESENT ILLNESS
[1 Year Ago] : 1 year ago [Good] : being in good health [Healthy Diet] : a healthy diet [Weight Concerns] : no concerns with her weight [Last Pap ___] : Last cervical pap smear was [unfilled] [Postmenopausal] : is postmenopausal [Post-Menopause, No Sxs] : post-menopausal, currently without symptoms [Sexually Active] : is not sexually active

## 2022-05-03 DIAGNOSIS — Z01.419 ENCOUNTER FOR GYNECOLOGICAL EXAMINATION (GENERAL) (ROUTINE) WITHOUT ABNORMAL FINDINGS: ICD-10-CM

## 2022-06-02 ENCOUNTER — OUTPATIENT (OUTPATIENT)
Dept: OUTPATIENT SERVICES | Facility: HOSPITAL | Age: 42
LOS: 1 days | End: 2022-06-02
Payer: MEDICARE

## 2022-06-02 DIAGNOSIS — Z01.20 ENCOUNTER FOR DENTAL EXAMINATION AND CLEANING WITHOUT ABNORMAL FINDINGS: ICD-10-CM

## 2022-06-02 DIAGNOSIS — K08.9 DISORDER OF TEETH AND SUPPORTING STRUCTURES, UNSPECIFIED: ICD-10-CM

## 2022-06-02 PROCEDURE — D0120: CPT

## 2022-06-02 PROCEDURE — D1110: CPT

## 2022-11-09 ENCOUNTER — APPOINTMENT (OUTPATIENT)
Dept: ULTRASOUND IMAGING | Facility: IMAGING CENTER | Age: 42
End: 2022-11-09

## 2022-11-09 ENCOUNTER — RESULT REVIEW (OUTPATIENT)
Age: 42
End: 2022-11-09

## 2022-11-09 ENCOUNTER — OUTPATIENT (OUTPATIENT)
Dept: OUTPATIENT SERVICES | Facility: HOSPITAL | Age: 42
LOS: 1 days | End: 2022-11-09
Payer: MEDICARE

## 2022-11-09 DIAGNOSIS — Z00.8 ENCOUNTER FOR OTHER GENERAL EXAMINATION: ICD-10-CM

## 2022-11-09 PROCEDURE — 76641 ULTRASOUND BREAST COMPLETE: CPT

## 2022-11-09 PROCEDURE — 76641 ULTRASOUND BREAST COMPLETE: CPT | Mod: 26,50

## 2022-12-02 ENCOUNTER — APPOINTMENT (OUTPATIENT)
Dept: INTERNAL MEDICINE | Facility: CLINIC | Age: 42
End: 2022-12-02

## 2022-12-02 ENCOUNTER — OUTPATIENT (OUTPATIENT)
Dept: OUTPATIENT SERVICES | Facility: HOSPITAL | Age: 42
LOS: 1 days | End: 2022-12-02
Payer: MEDICARE

## 2022-12-02 VITALS
BODY MASS INDEX: 20.01 KG/M2 | OXYGEN SATURATION: 96 % | SYSTOLIC BLOOD PRESSURE: 122 MMHG | HEIGHT: 61 IN | WEIGHT: 106 LBS | DIASTOLIC BLOOD PRESSURE: 70 MMHG | HEART RATE: 87 BPM

## 2022-12-02 DIAGNOSIS — I10 ESSENTIAL (PRIMARY) HYPERTENSION: ICD-10-CM

## 2022-12-02 LAB
ALBUMIN SERPL ELPH-MCNC: 4.5 G/DL
ALP BLD-CCNC: 83 U/L
ALT SERPL-CCNC: 11 U/L
ANION GAP SERPL CALC-SCNC: 11 MMOL/L
AST SERPL-CCNC: 13 U/L
BILIRUB SERPL-MCNC: 0.3 MG/DL
BUN SERPL-MCNC: 13 MG/DL
CALCIUM SERPL-MCNC: 9.6 MG/DL
CHLORIDE SERPL-SCNC: 102 MMOL/L
CHOLEST SERPL-MCNC: 164 MG/DL
CO2 SERPL-SCNC: 28 MMOL/L
CREAT SERPL-MCNC: 0.38 MG/DL
EGFR: 127 ML/MIN/1.73M2
GLUCOSE SERPL-MCNC: 78 MG/DL
HDLC SERPL-MCNC: 57 MG/DL
LDLC SERPL CALC-MCNC: 73 MG/DL
NONHDLC SERPL-MCNC: 107 MG/DL
POTASSIUM SERPL-SCNC: 4.2 MMOL/L
PROT SERPL-MCNC: 7.1 G/DL
SODIUM SERPL-SCNC: 141 MMOL/L
TRIGL SERPL-MCNC: 168 MG/DL

## 2022-12-02 PROCEDURE — G0463: CPT | Mod: 25

## 2022-12-02 PROCEDURE — T1013: CPT

## 2022-12-02 PROCEDURE — 99213 OFFICE O/P EST LOW 20 MIN: CPT | Mod: GE

## 2022-12-02 PROCEDURE — 83036 HEMOGLOBIN GLYCOSYLATED A1C: CPT

## 2022-12-02 PROCEDURE — 85025 COMPLETE CBC W/AUTO DIFF WBC: CPT

## 2022-12-02 PROCEDURE — 86480 TB TEST CELL IMMUN MEASURE: CPT

## 2022-12-02 PROCEDURE — 80053 COMPREHEN METABOLIC PANEL: CPT

## 2022-12-02 PROCEDURE — 80061 LIPID PANEL: CPT

## 2022-12-04 NOTE — INTERPRETER SERVICES
[Other: ______] : provided by ARIANE [Time Spent: ____ minutes] : Total time spent using  services: [unfilled] minutes. The patient's primary language is not English thus required  services. [TWNoteComboBox1] : American Sign Language

## 2022-12-04 NOTE — HISTORY OF PRESENT ILLNESS
[Formal Caregiver] : formal caregiver [Other: _____] : [unfilled] [FreeTextEntry1] : CPE [de-identified] : 42 year old female with hx of cerebral palsy, seizure disorder, spasm disorder, G6PD deficiency, and congenital deafness/mutism presenting for a CPE. She was last seen a year ago without significant complaints. Since then, she saw her neurologist without significant changes in her medication regimen. She has no other significant events occurring since then. Continues to live in a group home and has good social interactions with others. Is physically active it appears with patient describing her exercise bicycle that she uses frequently. Diet consists of 0.25inch bite sized pieces. She describes an event where she had a fall in November with some bruising on her leg but without other symptoms. No other concerns expressed by her or her care giver.

## 2022-12-04 NOTE — PHYSICAL EXAM
[No Acute Distress] : no acute distress [Well Nourished] : well nourished [Well Developed] : well developed [Well-Appearing] : well-appearing [Normal Sclera/Conjunctiva] : normal sclera/conjunctiva [PERRL] : pupils equal round and reactive to light [EOMI] : extraocular movements intact [Normal Outer Ear/Nose] : the outer ears and nose were normal in appearance [Normal Oropharynx] : the oropharynx was normal [No JVD] : no jugular venous distention [No Lymphadenopathy] : no lymphadenopathy [Supple] : supple [Thyroid Normal, No Nodules] : the thyroid was normal and there were no nodules present [No Respiratory Distress] : no respiratory distress  [No Accessory Muscle Use] : no accessory muscle use [Clear to Auscultation] : lungs were clear to auscultation bilaterally [Normal Rate] : normal rate  [Regular Rhythm] : with a regular rhythm [Normal S1, S2] : normal S1 and S2 [No Murmur] : no murmur heard [No Carotid Bruits] : no carotid bruits [No Abdominal Bruit] : a ~M bruit was not heard ~T in the abdomen [No Varicosities] : no varicosities [Pedal Pulses Present] : the pedal pulses are present [No Edema] : there was no peripheral edema [No Palpable Aorta] : no palpable aorta [No Extremity Clubbing/Cyanosis] : no extremity clubbing/cyanosis [Soft] : abdomen soft [No Masses] : no abdominal mass palpated [No HSM] : no HSM [Normal Bowel Sounds] : normal bowel sounds [Normal Posterior Cervical Nodes] : no posterior cervical lymphadenopathy [Normal Anterior Cervical Nodes] : no anterior cervical lymphadenopathy [No CVA Tenderness] : no CVA  tenderness [No Spinal Tenderness] : no spinal tenderness [No Joint Swelling] : no joint swelling [Grossly Normal Strength/Tone] : grossly normal strength/tone [No Rash] : no rash [Normal Affect] : the affect was normal [Normal Insight/Judgement] : insight and judgment were intact [Non Tender] : non-tender [de-identified] : +distension  [de-identified] : +spastic movements, non-verbal, AOx3, 5/5 strength/sensation b/l upper and lower

## 2022-12-04 NOTE — HEALTH RISK ASSESSMENT
[Very Good] : ~his/her~  mood as very good [Never] : Never [No] : No [1 or 2 (0 pts)] : 1 or 2 (0 points) [Never (0 pts)] : Never (0 points) [One fall no injury in past year] : Patient reported one fall in the past year without injury [0] : 2) Feeling down, depressed, or hopeless: Not at all (0) [Patient reported PAP Smear was normal] : Patient reported PAP Smear was normal [Language] : difficulty with language [Handling Complex Tasks] : difficulty handling complex tasks [Safety elements used in home] : safety elements used in home [Reports changes in hearing] : Reports no changes in hearing [Reports changes in vision] : Reports no changes in vision [Reports changes in dental health] : Reports no changes in dental health [Caregiver Concerns] : does not have caregiver concerns [PapSmearDate] : 04/22 [de-identified] : Group home

## 2022-12-04 NOTE — ASSESSMENT
[FreeTextEntry1] : 42 year old female with hx of cerebral palsy, seizure disorder, spasm disorder, G6PD deficiency, and congenital deafness/mutism presenting for a CPE.\par \par #HCM\par Routine physical without concern. No seizures in the last year. Medications and dosages are appropriate for patient as per neurology. No concerns expressed by caregiver or patient. \par - routine labs today, cbc, cmp, lipid, a1c\par - quantiferon gold today as per group home \par - refills send to pharmacy\par - forms to be filled out after labs resulted + faxed over\par \par RTC in 1 year for CPE or sooner if needed\par \par Case discussed w/ Dr. Marmolejo

## 2022-12-05 DIAGNOSIS — Z00.00 ENCOUNTER FOR GENERAL ADULT MEDICAL EXAMINATION WITHOUT ABNORMAL FINDINGS: ICD-10-CM

## 2022-12-05 DIAGNOSIS — F79 UNSPECIFIED INTELLECTUAL DISABILITIES: ICD-10-CM

## 2022-12-05 DIAGNOSIS — G80.9 CEREBRAL PALSY, UNSPECIFIED: ICD-10-CM

## 2022-12-08 ENCOUNTER — OUTPATIENT (OUTPATIENT)
Dept: OUTPATIENT SERVICES | Facility: HOSPITAL | Age: 42
LOS: 1 days | End: 2022-12-08
Payer: MEDICARE

## 2022-12-08 DIAGNOSIS — K08.9 DISORDER OF TEETH AND SUPPORTING STRUCTURES, UNSPECIFIED: ICD-10-CM

## 2022-12-08 LAB
BASOPHILS # BLD AUTO: 0.06 K/UL
BASOPHILS NFR BLD AUTO: 0.8 %
EOSINOPHIL # BLD AUTO: 0.21 K/UL
EOSINOPHIL NFR BLD AUTO: 2.8 %
ESTIMATED AVERAGE GLUCOSE: 91 MG/DL
HBA1C MFR BLD HPLC: 4.8 %
HCT VFR BLD CALC: 42.1 %
HGB BLD-MCNC: 13.7 G/DL
IMM GRANULOCYTES NFR BLD AUTO: 0.3 %
LYMPHOCYTES # BLD AUTO: 1.58 K/UL
LYMPHOCYTES NFR BLD AUTO: 21.2 %
M TB IFN-G BLD-IMP: NEGATIVE
MAN DIFF?: NORMAL
MCHC RBC-ENTMCNC: 31.7 PG
MCHC RBC-ENTMCNC: 32.5 GM/DL
MCV RBC AUTO: 97.5 FL
MONOCYTES # BLD AUTO: 0.6 K/UL
MONOCYTES NFR BLD AUTO: 8 %
NEUTROPHILS # BLD AUTO: 4.99 K/UL
NEUTROPHILS NFR BLD AUTO: 66.9 %
PLATELET # BLD AUTO: 191 K/UL
QUANTIFERON TB PLUS MITOGEN MINUS NIL: 2.5 IU/ML
QUANTIFERON TB PLUS NIL: 0.03 IU/ML
QUANTIFERON TB PLUS TB1 MINUS NIL: 0 IU/ML
QUANTIFERON TB PLUS TB2 MINUS NIL: 0 IU/ML
RBC # BLD: 4.32 M/UL
RBC # FLD: 11.2 %
WBC # FLD AUTO: 7.46 K/UL

## 2022-12-08 PROCEDURE — D0120: CPT

## 2022-12-08 PROCEDURE — D1110: CPT

## 2022-12-21 DIAGNOSIS — Z01.20 ENCOUNTER FOR DENTAL EXAMINATION AND CLEANING WITHOUT ABNORMAL FINDINGS: ICD-10-CM

## 2023-01-04 ENCOUNTER — RX RENEWAL (OUTPATIENT)
Age: 43
End: 2023-01-04

## 2023-01-10 ENCOUNTER — RX RENEWAL (OUTPATIENT)
Age: 43
End: 2023-01-10

## 2023-02-07 RX ORDER — PSYLLIUM HUSK 0.4 G
1000-20 CAPSULE ORAL
Qty: 60 | Refills: 1 | Status: DISCONTINUED | COMMUNITY
Start: 2021-04-27 | End: 2023-02-07

## 2023-03-09 ENCOUNTER — OUTPATIENT (OUTPATIENT)
Dept: OUTPATIENT SERVICES | Facility: HOSPITAL | Age: 43
LOS: 1 days | End: 2023-03-09
Payer: MEDICARE

## 2023-03-09 ENCOUNTER — APPOINTMENT (OUTPATIENT)
Dept: NEUROLOGY | Facility: HOSPITAL | Age: 43
End: 2023-03-09

## 2023-03-09 VITALS
SYSTOLIC BLOOD PRESSURE: 114 MMHG | WEIGHT: 132.28 LBS | BODY MASS INDEX: 24.97 KG/M2 | HEART RATE: 98 BPM | HEIGHT: 61 IN | DIASTOLIC BLOOD PRESSURE: 70 MMHG | RESPIRATION RATE: 14 BRPM | TEMPERATURE: 97 F

## 2023-03-09 DIAGNOSIS — R56.9 UNSPECIFIED CONVULSIONS: ICD-10-CM

## 2023-03-09 PROCEDURE — 36415 COLL VENOUS BLD VENIPUNCTURE: CPT

## 2023-03-09 PROCEDURE — G0463: CPT

## 2023-03-09 PROCEDURE — 80164 ASSAY DIPROPYLACETIC ACD TOT: CPT

## 2023-03-10 DIAGNOSIS — G80.9 CEREBRAL PALSY, UNSPECIFIED: ICD-10-CM

## 2023-03-10 DIAGNOSIS — G40.909 EPILEPSY, UNSPECIFIED, NOT INTRACTABLE, WITHOUT STATUS EPILEPTICUS: ICD-10-CM

## 2023-03-10 LAB — VALPROATE SERPL-MCNC: 71 UG/ML — SIGNIFICANT CHANGE UP (ref 50–100)

## 2023-03-16 ENCOUNTER — NON-APPOINTMENT (OUTPATIENT)
Age: 43
End: 2023-03-16

## 2023-03-23 NOTE — HISTORY OF PRESENT ILLNESS
[FreeTextEntry1] : Interval history 3/9/23: Patient reports that she feels well. No seizures in the interim. No change in medications since last visit. Medications as below, per documentation provided by group home employee.\par \par Current medications:\par Valproic acid 250 mg / 500 mg (needs refill)\par Clonazepam 0.25 mg ODT QD\par Folic acid 1 mg QD\par Olanzapine 2.5 mg qHS\par Sertraline 100 mg qAM\par Vitamin D 2,000 unit QD\par \par -----\par Patient is a 40 yo F with CP, MR, deafness and mutism, seizure disorder who follows with Neurology clinic. Patient presents for follow up visit. Group home employee and ALS  at bedside to provide collateral. No concerns or acute events since prior visit. Patient is tolerating her medications well. Patient reports no complaints. Pt lives in a group home and per group home employee, she has not had a seizure in >10 years. Patient currently feeling well on /500, Klonopin 0.25mg qHS, sertaline 100mg QD and olanzapine 2.5mg qHS. She is compliant with all her medications. Patient denies any further events concerning for seizures or ER visits. At baseline, pt able to ambulate independently, follows commands, nonverbal. Pt has been less agitated.\par \par \par Meds:\par Valproic acid 250mg - 500mg\par Klonipin 0.25 mg nightly\par Sertraline 100 mg OD daily in morning\par Olanzapine 2.5 mg nightly

## 2023-03-23 NOTE — END OF VISIT
[] : Resident [FreeTextEntry3] : Asymptomatic, stable exam\par Will ask for VPA level, complete extensive blood work with PCP - will need a copy\par Follow up with Psychiatry - VPA and clonazepam are used for behavior and mood modification as well

## 2023-03-23 NOTE — ASSESSMENT
[FreeTextEntry1] : 41yo F with CP, MR, deafness and mutism, seizure disorder; currently stable on AED regiment, no new concern or focal neurologic deficits on exam. CBC/CMP wnl.\par \par Recommend: \par [] Obtain VPA level\par [] Continue Valproic acid 250mg - 500mg - refill sent \par [] Continue Klonipin 0.25mg qHS \par [] Continue Sertraline 100mg OD and Olanzapine 2.5mg qHS\par [] RTC 1 year. \par \par \par Case discussed with Dr. Jama

## 2023-03-23 NOTE — REASON FOR VISIT
[Follow-Up: _____] : a [unfilled] follow-up visit [Formal Caregiver] : formal caregiver [Interpreters_FullName] : Grace Paris [TWNoteComboBox1] : American Sign Language

## 2023-04-06 ENCOUNTER — RX RENEWAL (OUTPATIENT)
Age: 43
End: 2023-04-06

## 2023-05-02 ENCOUNTER — APPOINTMENT (OUTPATIENT)
Dept: OBGYN | Facility: CLINIC | Age: 43
End: 2023-05-02
Payer: MEDICARE

## 2023-05-02 ENCOUNTER — OUTPATIENT (OUTPATIENT)
Dept: OUTPATIENT SERVICES | Facility: HOSPITAL | Age: 43
LOS: 1 days | End: 2023-05-02
Payer: MEDICARE

## 2023-05-02 ENCOUNTER — LABORATORY RESULT (OUTPATIENT)
Age: 43
End: 2023-05-02

## 2023-05-02 VITALS — DIASTOLIC BLOOD PRESSURE: 60 MMHG | BODY MASS INDEX: 20.22 KG/M2 | SYSTOLIC BLOOD PRESSURE: 102 MMHG | WEIGHT: 107 LBS

## 2023-05-02 DIAGNOSIS — E28.319 ASYMPTOMATIC PREMATURE MENOPAUSE: ICD-10-CM

## 2023-05-02 DIAGNOSIS — N76.0 ACUTE VAGINITIS: ICD-10-CM

## 2023-05-02 DIAGNOSIS — Z11.3 ENCOUNTER FOR SCREENING FOR INFECTIONS WITH A PREDOMINANTLY SEXUAL MODE OF TRANSMISSION: ICD-10-CM

## 2023-05-02 PROCEDURE — 87591 N.GONORRHOEAE DNA AMP PROB: CPT

## 2023-05-02 PROCEDURE — 87491 CHLMYD TRACH DNA AMP PROBE: CPT

## 2023-05-02 PROCEDURE — T1013: CPT

## 2023-05-02 PROCEDURE — G0463: CPT

## 2023-05-02 PROCEDURE — G0101: CPT

## 2023-05-02 PROCEDURE — 99213 OFFICE O/P EST LOW 20 MIN: CPT | Mod: 25

## 2023-05-02 PROCEDURE — G0101: CPT | Mod: NC

## 2023-05-02 NOTE — PHYSICAL EXAM
[Awake] : awake [Alert] : alert [Acute Distress] : no acute distress [Mass] : no breast mass [Nipple Discharge] : no nipple discharge [Axillary LAD] : no axillary lymphadenopathy [Soft] : soft [Tender] : non tender [Distended] : not distended [H/Smegaly] : no hepatosplenomegaly [Oriented x3] : disoriented to person, place, and time [Normal] : uterus [Labia Majora] : labia major [Labia Minora] : labia minora [No Bleeding] : there was no active vaginal bleeding [Discharge] : had no discharge [Pap Obtained] : a Pap smear was not performed [Motion Tenderness] : there was no cervical motion tenderness [Normal Position] : in a normal position [Tenderness] : nontender [Uterine Adnexae] : were not tender and not enlarged [RRR, No Murmurs] : RRR, no murmurs

## 2023-05-02 NOTE — BEGINNING OF VISIT
[Patient] : patient [Formal Caregiver] : formal caregiver [] :  [Other: ______] : provided by ARIANE [Time Spent: ____ minutes] : Total time spent using  services: [unfilled] minutes. The patient's primary language is not English thus required  services. [TWNoteComboBox1] : American Sign Language

## 2023-05-02 NOTE — HISTORY OF PRESENT ILLNESS
[1 Year Ago] : 1 year ago [Good] : being in good health [Last Pap ___] : Last cervical pap smear was [unfilled] [Postmenopausal] : is postmenopausal [Menstrual Problems] : reports normal menses [Contraception] : does not use contraception [Pregnancy History] : denies prior pregnancies [Post-Menopause, No Sxs] : post-menopausal, currently without symptoms [Menopause Age: ____] : age at menopause was [unfilled] [Sexually Active] : is not sexually active

## 2023-05-03 LAB
C TRACH RRNA SPEC QL NAA+PROBE: SIGNIFICANT CHANGE UP
N GONORRHOEA RRNA SPEC QL NAA+PROBE: SIGNIFICANT CHANGE UP
SPECIMEN SOURCE: SIGNIFICANT CHANGE UP

## 2023-05-16 DIAGNOSIS — E28.319 ASYMPTOMATIC PREMATURE MENOPAUSE: ICD-10-CM

## 2023-05-16 DIAGNOSIS — Z01.419 ENCOUNTER FOR GYNECOLOGICAL EXAMINATION (GENERAL) (ROUTINE) WITHOUT ABNORMAL FINDINGS: ICD-10-CM

## 2023-05-19 ENCOUNTER — NON-APPOINTMENT (OUTPATIENT)
Age: 43
End: 2023-05-19

## 2023-06-07 ENCOUNTER — RX RENEWAL (OUTPATIENT)
Age: 43
End: 2023-06-07

## 2023-06-07 ENCOUNTER — OUTPATIENT (OUTPATIENT)
Dept: OUTPATIENT SERVICES | Facility: HOSPITAL | Age: 43
LOS: 1 days | End: 2023-06-07
Payer: MEDICARE

## 2023-06-07 ENCOUNTER — APPOINTMENT (OUTPATIENT)
Dept: INTERNAL MEDICINE | Facility: CLINIC | Age: 43
End: 2023-06-07
Payer: MEDICARE

## 2023-06-07 VITALS
SYSTOLIC BLOOD PRESSURE: 116 MMHG | HEIGHT: 61 IN | OXYGEN SATURATION: 97 % | BODY MASS INDEX: 20.2 KG/M2 | HEART RATE: 76 BPM | DIASTOLIC BLOOD PRESSURE: 80 MMHG | WEIGHT: 107 LBS

## 2023-06-07 DIAGNOSIS — I10 ESSENTIAL (PRIMARY) HYPERTENSION: ICD-10-CM

## 2023-06-07 DIAGNOSIS — D75.A GLUCOSE-6-PHOSPHATE DEHYDROGENASE: ICD-10-CM

## 2023-06-07 PROCEDURE — G0463: CPT

## 2023-06-07 PROCEDURE — 99214 OFFICE O/P EST MOD 30 MIN: CPT | Mod: GC

## 2023-06-07 NOTE — PHYSICAL EXAM
[No Acute Distress] : no acute distress [Well Nourished] : well nourished [Well-Appearing] : well-appearing [Normal Sclera/Conjunctiva] : normal sclera/conjunctiva [Normal Outer Ear/Nose] : the outer ears and nose were normal in appearance [PERRL] : pupils equal round and reactive to light [Normal Oropharynx] : the oropharynx was normal [Normal TMs] : both tympanic membranes were normal [Normal Nasal Mucosa] : the nasal mucosa was normal [Supple] : supple [No Respiratory Distress] : no respiratory distress  [No Accessory Muscle Use] : no accessory muscle use [Clear to Auscultation] : lungs were clear to auscultation bilaterally [Normal Rate] : normal rate  [Regular Rhythm] : with a regular rhythm [Normal S1, S2] : normal S1 and S2 [No Murmur] : no murmur heard [Pedal Pulses Present] : the pedal pulses are present [No Edema] : there was no peripheral edema [Soft] : abdomen soft [Non Tender] : non-tender [Non-distended] : non-distended [Normal Bowel Sounds] : normal bowel sounds [Alert and Oriented x3] : oriented to person, place, and time [de-identified] : pt w/ UE contractions and moving frequently during exam  [de-identified] :  strength intact, moving upper extremities and lower extremities  [de-identified] : no visualized rash/ecchymoses/cuts/sores of face/extremities

## 2023-06-07 NOTE — HISTORY OF PRESENT ILLNESS
[FreeTextEntry1] : F/u [de-identified] : 42F MR, CP, szr d/o, spasm d/o, G6PD deficiency, congenital deafness/mutism (sign language communication w/ ), axial degenerative myopia, seen for CPE 12/2022, seen by neuro 3/2023 at which time relayed stable on AED regimen w/ rec to c/w meds; seen by GYN 5/2023 w/ LMP 2 yrs ago (PAP next due 2026, breast US yearly referred by them) who presents for f/u assisted by group home aide (who knows pt x 10 yrs) and . \par \par Relays feeling generally well. Adherent to medication regimen w/o seizure or worsened sx. Reports participant in day program activities/exercises, and eats well-balanced diet including fruits, vegetables. \par \par Otherwise denies complaints.

## 2023-06-07 NOTE — PLAN
[FreeTextEntry1] : \par #seizure d/o\par c/w AED per neuro\par - f/u neuro \par \par #HCM\par - mammo/US referral printed and f/u GYN for cervical CA screen \par - adacel vax (TDAP) sent to pharmacy (per RN insurance did not cover in clinic) \par - refer to colonoscopy given age 43 by end of yr \par \par rtc  for CPE or sooner prn at which time remaining eval/mgt of chronic issues and HCM to be discussed \par \par case and plan d/w Dr. Kassi Padilla MD (attending physician)\par \par Geena Posey MD PGY-3\par Internal Medicine Resident\par [phone]

## 2023-06-07 NOTE — INTERPRETER SERVICES
[Interpreters_FullName] : Fany Mackay (per AllScripts info tab)  [TWNoteComboBox1] : American Sign Language

## 2023-06-07 NOTE — ASSESSMENT
[FreeTextEntry1] : \par 42F MR, CP, szr d/o, spasm d/o, G6PD deficiency, congenital deafness/mutism (sign language communication w/ ), axial degenerative myopia, seen for CPE 12/2022, seen by neuro 3/2023 at which time relayed stable on AED regimen w/ rec to c/w meds; seen by GYN 5/2023 w/ LMP 2 yrs ago (PAP next due 2026, breast US yearly referred by them) who presents for f/u assisted by group home aide (who knows pt x 10 yrs) and .

## 2023-06-08 DIAGNOSIS — G80.9 CEREBRAL PALSY, UNSPECIFIED: ICD-10-CM

## 2023-06-08 DIAGNOSIS — D75.A GLUCOSE-6-PHOSPHATE DEHYDROGENASE (G6PD) DEFICIENCY WITHOUT ANEMIA: ICD-10-CM

## 2023-06-08 DIAGNOSIS — G40.909 EPILEPSY, UNSPECIFIED, NOT INTRACTABLE, WITHOUT STATUS EPILEPTICUS: ICD-10-CM

## 2023-06-08 DIAGNOSIS — H91.3 DEAF NONSPEAKING, NOT ELSEWHERE CLASSIFIED: ICD-10-CM

## 2023-06-15 ENCOUNTER — OUTPATIENT (OUTPATIENT)
Dept: OUTPATIENT SERVICES | Facility: HOSPITAL | Age: 43
LOS: 1 days | End: 2023-06-15
Payer: MEDICARE

## 2023-06-15 DIAGNOSIS — Z01.20 ENCOUNTER FOR DENTAL EXAMINATION AND CLEANING WITHOUT ABNORMAL FINDINGS: ICD-10-CM

## 2023-06-15 DIAGNOSIS — K08.9 DISORDER OF TEETH AND SUPPORTING STRUCTURES, UNSPECIFIED: ICD-10-CM

## 2023-06-15 PROCEDURE — D0120: CPT

## 2023-06-15 PROCEDURE — D1110: CPT

## 2023-07-14 ENCOUNTER — APPOINTMENT (OUTPATIENT)
Dept: MAMMOGRAPHY | Facility: IMAGING CENTER | Age: 43
End: 2023-07-14

## 2023-07-14 ENCOUNTER — APPOINTMENT (OUTPATIENT)
Dept: ULTRASOUND IMAGING | Facility: IMAGING CENTER | Age: 43
End: 2023-07-14

## 2023-08-14 ENCOUNTER — RESULT REVIEW (OUTPATIENT)
Age: 43
End: 2023-08-14

## 2023-08-14 ENCOUNTER — OUTPATIENT (OUTPATIENT)
Dept: OUTPATIENT SERVICES | Facility: HOSPITAL | Age: 43
LOS: 1 days | End: 2023-08-14
Payer: MEDICARE

## 2023-08-14 ENCOUNTER — APPOINTMENT (OUTPATIENT)
Dept: MAMMOGRAPHY | Facility: IMAGING CENTER | Age: 43
End: 2023-08-14
Payer: MEDICARE

## 2023-08-14 DIAGNOSIS — Z00.00 ENCOUNTER FOR GENERAL ADULT MEDICAL EXAMINATION WITHOUT ABNORMAL FINDINGS: ICD-10-CM

## 2023-08-14 PROCEDURE — 76642 ULTRASOUND BREAST LIMITED: CPT | Mod: 26,50

## 2023-08-14 PROCEDURE — 77063 BREAST TOMOSYNTHESIS BI: CPT

## 2023-08-14 PROCEDURE — 76642 ULTRASOUND BREAST LIMITED: CPT

## 2023-08-14 PROCEDURE — 77067 SCR MAMMO BI INCL CAD: CPT | Mod: 26

## 2023-08-14 PROCEDURE — 77063 BREAST TOMOSYNTHESIS BI: CPT | Mod: 26

## 2023-08-14 PROCEDURE — 77067 SCR MAMMO BI INCL CAD: CPT

## 2023-08-15 ENCOUNTER — APPOINTMENT (OUTPATIENT)
Dept: MAMMOGRAPHY | Facility: IMAGING CENTER | Age: 43
End: 2023-08-15

## 2023-09-28 ENCOUNTER — APPOINTMENT (OUTPATIENT)
Dept: INTERNAL MEDICINE | Facility: CLINIC | Age: 43
End: 2023-09-28

## 2023-10-27 DIAGNOSIS — R13.10 DYSPHAGIA, UNSPECIFIED: ICD-10-CM

## 2023-11-19 ENCOUNTER — NON-APPOINTMENT (OUTPATIENT)
Age: 43
End: 2023-11-19

## 2023-12-06 ENCOUNTER — NON-APPOINTMENT (OUTPATIENT)
Age: 43
End: 2023-12-06

## 2023-12-06 ENCOUNTER — APPOINTMENT (OUTPATIENT)
Dept: INTERNAL MEDICINE | Facility: CLINIC | Age: 43
End: 2023-12-06
Payer: MEDICARE

## 2023-12-06 ENCOUNTER — OUTPATIENT (OUTPATIENT)
Dept: OUTPATIENT SERVICES | Facility: HOSPITAL | Age: 43
LOS: 1 days | End: 2023-12-06
Payer: MEDICARE

## 2023-12-06 VITALS
DIASTOLIC BLOOD PRESSURE: 70 MMHG | BODY MASS INDEX: 20.39 KG/M2 | WEIGHT: 108 LBS | OXYGEN SATURATION: 98 % | HEART RATE: 82 BPM | SYSTOLIC BLOOD PRESSURE: 102 MMHG | HEIGHT: 61 IN

## 2023-12-06 DIAGNOSIS — Z23 ENCOUNTER FOR IMMUNIZATION: ICD-10-CM

## 2023-12-06 DIAGNOSIS — I10 ESSENTIAL (PRIMARY) HYPERTENSION: ICD-10-CM

## 2023-12-06 PROCEDURE — 83036 HEMOGLOBIN GLYCOSYLATED A1C: CPT

## 2023-12-06 PROCEDURE — 85027 COMPLETE CBC AUTOMATED: CPT

## 2023-12-06 PROCEDURE — 86480 TB TEST CELL IMMUN MEASURE: CPT

## 2023-12-06 PROCEDURE — 36415 COLL VENOUS BLD VENIPUNCTURE: CPT

## 2023-12-06 PROCEDURE — 80164 ASSAY DIPROPYLACETIC ACD TOT: CPT

## 2023-12-06 PROCEDURE — 80053 COMPREHEN METABOLIC PANEL: CPT

## 2023-12-06 PROCEDURE — 80061 LIPID PANEL: CPT

## 2023-12-06 PROCEDURE — 84443 ASSAY THYROID STIM HORMONE: CPT

## 2023-12-06 PROCEDURE — 99213 OFFICE O/P EST LOW 20 MIN: CPT | Mod: GE,25

## 2023-12-06 RX ORDER — INFLUENZA A VIRUS A/VICTORIA/4897/2022 IVR-238 (H1N1) ANTIGEN (FORMALDEHYDE INACTIVATED), INFLUENZA A VIRUS A/DARWIN/9/2021 SAN-010 (H3N2) ANTIGEN (FORMALDEHYDE INACTIVATED), INFLUENZA B VIRUS B/PHUKET/3073/2013 ANTIGEN (FORMALDEHYDE INACTIVATED), AND INFLUENZA B VIRUS B/MICHIGAN/01/2021 ANTIGEN (FORMALDEHYDE INACTIVATED) 15; 15; 15; 15 UG/.5ML; UG/.5ML; UG/.5ML; UG/.5ML
0.5 INJECTION, SUSPENSION INTRAMUSCULAR
Qty: 1 | Refills: 0 | Status: DISCONTINUED | OUTPATIENT
Start: 2022-08-30 | End: 2023-12-06

## 2023-12-06 RX ORDER — OLANZAPINE 2.5 MG/1
2.5 TABLET, FILM COATED ORAL DAILY
Qty: 90 | Refills: 0 | Status: ACTIVE | COMMUNITY
Start: 2023-12-06

## 2023-12-06 RX ORDER — INFLUENZA A VIRUS A/VICTORIA/4897/2022 IVR-238 (H1N1) ANTIGEN (FORMALDEHYDE INACTIVATED), INFLUENZA A VIRUS A/DARWIN/9/2021 SAN-010 (H3N2) ANTIGEN (FORMALDEHYDE INACTIVATED), INFLUENZA B VIRUS B/PHUKET/3073/2013 ANTIGEN (FORMALDEHYDE INACTIVATED), AND INFLUENZA B VIRUS B/MICHIGAN/01/2021 ANTIGEN (FORMALDEHYDE INACTIVATED) 15; 15; 15; 15 UG/.5ML; UG/.5ML; UG/.5ML; UG/.5ML
0.5 INJECTION, SUSPENSION INTRAMUSCULAR
Qty: 1 | Refills: 0 | Status: DISCONTINUED | OUTPATIENT
Start: 2023-09-15 | End: 2023-12-06

## 2023-12-06 RX ORDER — CALCIUM CARBONATE/VITAMIN D3 500MG-5MCG
500-5 TABLET ORAL
Qty: 90 | Refills: 0 | Status: DISCONTINUED | COMMUNITY
Start: 2023-02-08 | End: 2023-12-06

## 2023-12-06 RX ORDER — CLONAZEPAM 0.25 MG/1
0.25 TABLET, ORALLY DISINTEGRATING ORAL
Qty: 30 | Refills: 0 | Status: DISCONTINUED | COMMUNITY
Start: 2023-08-22 | End: 2023-12-06

## 2023-12-06 RX ORDER — CALCIUM CARBONATE/VITAMIN D3 500MG-5MCG
500-5 TABLET ORAL
Qty: 30 | Refills: 4 | Status: DISCONTINUED | COMMUNITY
Start: 2023-04-06 | End: 2023-12-06

## 2023-12-06 RX ORDER — LORATADINE 10 MG
10 TABLET,CHEWABLE ORAL DAILY
Qty: 30 | Refills: 0 | Status: DISCONTINUED | COMMUNITY
Start: 2022-12-02 | End: 2023-12-06

## 2023-12-07 LAB
ALBUMIN SERPL ELPH-MCNC: 4.6 G/DL
ALP BLD-CCNC: 77 U/L
ALT SERPL-CCNC: 7 U/L
ANION GAP SERPL CALC-SCNC: 14 MMOL/L
AST SERPL-CCNC: 11 U/L
BILIRUB SERPL-MCNC: 0.3 MG/DL
BUN SERPL-MCNC: 16 MG/DL
CALCIUM SERPL-MCNC: 9.7 MG/DL
CHLORIDE SERPL-SCNC: 102 MMOL/L
CHOLEST SERPL-MCNC: 157 MG/DL
CO2 SERPL-SCNC: 26 MMOL/L
CREAT SERPL-MCNC: 0.44 MG/DL
EGFR: 121 ML/MIN/1.73M2
ESTIMATED AVERAGE GLUCOSE: 85 MG/DL
GLUCOSE SERPL-MCNC: 76 MG/DL
HBA1C MFR BLD HPLC: 4.6 %
HCT VFR BLD CALC: 43.8 %
HDLC SERPL-MCNC: 59 MG/DL
HGB BLD-MCNC: 14 G/DL
LDLC SERPL CALC-MCNC: 86 MG/DL
MCHC RBC-ENTMCNC: 31.2 PG
MCHC RBC-ENTMCNC: 32 GM/DL
MCV RBC AUTO: 97.6 FL
NONHDLC SERPL-MCNC: 99 MG/DL
PLATELET # BLD AUTO: 172 K/UL
POTASSIUM SERPL-SCNC: 4.9 MMOL/L
PROT SERPL-MCNC: 7.1 G/DL
RBC # BLD: 4.49 M/UL
RBC # FLD: 11.9 %
SODIUM SERPL-SCNC: 142 MMOL/L
TRIGL SERPL-MCNC: 62 MG/DL
TSH SERPL-ACNC: 0.65 UIU/ML
VALPROATE SERPL-MCNC: 82 UG/ML
WBC # FLD AUTO: 7.91 K/UL

## 2023-12-08 LAB
M TB IFN-G BLD-IMP: ABNORMAL
QUANTIFERON TB PLUS MITOGEN MINUS NIL: 0.34 IU/ML
QUANTIFERON TB PLUS NIL: 0.03 IU/ML
QUANTIFERON TB PLUS TB1 MINUS NIL: -0.01 IU/ML
QUANTIFERON TB PLUS TB2 MINUS NIL: -0.01 IU/ML

## 2023-12-14 DIAGNOSIS — G40.909 EPILEPSY, UNSPECIFIED, NOT INTRACTABLE, WITHOUT STATUS EPILEPTICUS: ICD-10-CM

## 2023-12-14 DIAGNOSIS — G80.9 CEREBRAL PALSY, UNSPECIFIED: ICD-10-CM

## 2023-12-14 DIAGNOSIS — Z23 ENCOUNTER FOR IMMUNIZATION: ICD-10-CM

## 2023-12-14 DIAGNOSIS — R25.2 CRAMP AND SPASM: ICD-10-CM

## 2023-12-14 DIAGNOSIS — Z00.00 ENCOUNTER FOR GENERAL ADULT MEDICAL EXAMINATION WITHOUT ABNORMAL FINDINGS: ICD-10-CM

## 2023-12-14 LAB
M TB IFN-G BLD-IMP: NEGATIVE
QUANTIFERON TB PLUS MITOGEN MINUS NIL: 3 IU/ML
QUANTIFERON TB PLUS NIL: 0.02 IU/ML
QUANTIFERON TB PLUS TB1 MINUS NIL: 0.01 IU/ML
QUANTIFERON TB PLUS TB2 MINUS NIL: 0 IU/ML

## 2023-12-21 ENCOUNTER — APPOINTMENT (OUTPATIENT)
Age: 43
End: 2023-12-21

## 2023-12-21 ENCOUNTER — APPOINTMENT (OUTPATIENT)
Age: 43
End: 2023-12-21
Payer: MEDICAID

## 2023-12-21 ENCOUNTER — OUTPATIENT (OUTPATIENT)
Dept: OUTPATIENT SERVICES | Facility: HOSPITAL | Age: 43
LOS: 1 days | End: 2023-12-21
Payer: MEDICARE

## 2023-12-21 PROCEDURE — D0120: CPT

## 2023-12-21 PROCEDURE — D1110: CPT

## 2023-12-21 PROCEDURE — ZZZZZ: CPT

## 2024-02-06 PROBLEM — Z00.00 ENCOUNTER FOR PREVENTIVE HEALTH EXAMINATION: Status: ACTIVE | Noted: 2024-02-06

## 2024-02-13 ENCOUNTER — APPOINTMENT (OUTPATIENT)
Dept: GASTROENTEROLOGY | Facility: HOSPITAL | Age: 44
End: 2024-02-13

## 2024-02-14 ENCOUNTER — OUTPATIENT (OUTPATIENT)
Dept: OUTPATIENT SERVICES | Facility: HOSPITAL | Age: 44
LOS: 1 days | Discharge: HOME | End: 2024-02-14

## 2024-02-14 ENCOUNTER — APPOINTMENT (OUTPATIENT)
Dept: OTOLARYNGOLOGY | Facility: CLINIC | Age: 44
End: 2024-02-14
Payer: MEDICARE

## 2024-02-14 DIAGNOSIS — Z01.20 ENCOUNTER FOR DENTAL EXAMINATION AND CLEANING WITHOUT ABNORMAL FINDINGS: ICD-10-CM

## 2024-02-14 PROCEDURE — 92610 EVALUATE SWALLOWING FUNCTION: CPT | Mod: GN

## 2024-02-16 ENCOUNTER — APPOINTMENT (OUTPATIENT)
Dept: INTERNAL MEDICINE | Facility: CLINIC | Age: 44
End: 2024-02-16
Payer: MEDICARE

## 2024-02-16 ENCOUNTER — OUTPATIENT (OUTPATIENT)
Dept: OUTPATIENT SERVICES | Facility: HOSPITAL | Age: 44
LOS: 1 days | End: 2024-02-16
Payer: MEDICARE

## 2024-02-16 ENCOUNTER — APPOINTMENT (OUTPATIENT)
Dept: INTERNAL MEDICINE | Facility: CLINIC | Age: 44
End: 2024-02-16

## 2024-02-16 VITALS
HEIGHT: 61 IN | HEART RATE: 100 BPM | WEIGHT: 108 LBS | DIASTOLIC BLOOD PRESSURE: 58 MMHG | SYSTOLIC BLOOD PRESSURE: 100 MMHG | BODY MASS INDEX: 20.39 KG/M2 | OXYGEN SATURATION: 97 %

## 2024-02-16 DIAGNOSIS — R13.12 DYSPHAGIA, OROPHARYNGEAL PHASE: ICD-10-CM

## 2024-02-16 DIAGNOSIS — I10 ESSENTIAL (PRIMARY) HYPERTENSION: ICD-10-CM

## 2024-02-16 PROCEDURE — G0463: CPT

## 2024-02-16 PROCEDURE — 99213 OFFICE O/P EST LOW 20 MIN: CPT | Mod: GC

## 2024-02-20 NOTE — ASSESSMENT
[FreeTextEntry1] : 43 YOF with cerebral palsy, seizure disorder, spasm d/o, G6PD deficiency, congenital deafness/mutism (sign language communication w/ ), axial degenerative myopia here for follow up.   #Mild Oropharyngeal Dysphagia  - Patient recently had a dysphagia screen with speech pathology and had some mild oropharyngeal dysphagia. Diet is switched to pureed diet. No Aspiration events with new diet  - Rx for Pureed diet provided  - Forms filled out for new diet and given to patient's aid   #HCM  - UTD with cervical cancer screen - UTD with MMG  - Has colonoscopy referral , patient to make an appointment  - UTD with Flu vaccine   D/w Dr. Kaiser

## 2024-02-20 NOTE — HISTORY OF PRESENT ILLNESS
[de-identified] : 43 YOF with cerebral palsy, seizure disorder, spasm d/o, G6PD deficiency, congenital deafness/mutism (sign language communication w/ ), axial degenerative myopia here for follow up. Patient presents with her home aid who is primary caretaker and who provided ASL translation. Patient is here for a new diet script after her recent follow up with Speech pathology for a dysphagia screen which showed mild oropharyngeal dysphagia. Her diet was recommended to be switched to puree. She has had no aspiration events at her group home which is where she lives. As per the aid, the patient's health care proxy is her brother. Patient is able to ambulate and able to communicate via ASL. Denies fever, chills, sob, chest pain, abd pain, diarrhea, constipation.

## 2024-02-27 ENCOUNTER — RESULT REVIEW (OUTPATIENT)
Age: 44
End: 2024-02-27

## 2024-02-28 DIAGNOSIS — G40.909 EPILEPSY, UNSPECIFIED, NOT INTRACTABLE, WITHOUT STATUS EPILEPTICUS: ICD-10-CM

## 2024-02-28 DIAGNOSIS — G80.9 CEREBRAL PALSY, UNSPECIFIED: ICD-10-CM

## 2024-03-12 ENCOUNTER — APPOINTMENT (OUTPATIENT)
Dept: NEUROLOGY | Facility: HOSPITAL | Age: 44
End: 2024-03-12

## 2024-03-12 ENCOUNTER — OUTPATIENT (OUTPATIENT)
Dept: OUTPATIENT SERVICES | Facility: HOSPITAL | Age: 44
LOS: 1 days | End: 2024-03-12
Payer: MEDICARE

## 2024-03-12 VITALS — HEART RATE: 68 BPM | TEMPERATURE: 96.3 F | SYSTOLIC BLOOD PRESSURE: 95 MMHG | DIASTOLIC BLOOD PRESSURE: 63 MMHG

## 2024-03-12 DIAGNOSIS — G40.909 EPILEPSY, UNSPECIFIED, NOT INTRACTABLE, WITHOUT STATUS EPILEPTICUS: ICD-10-CM

## 2024-03-12 DIAGNOSIS — G40.909 EPILEPSY, UNSPECIFIED, NOT INTRACTABLE, W/OUT STATUS EPILEPTICUS: ICD-10-CM

## 2024-03-12 DIAGNOSIS — F79 UNSPECIFIED INTELLECTUAL DISABILITIES: ICD-10-CM

## 2024-03-12 DIAGNOSIS — R47.9 UNSPECIFIED SPEECH DISTURBANCES: ICD-10-CM

## 2024-03-12 DIAGNOSIS — H91.3 DEAF NONSPEAKING, NOT ELSEWHERE CLASSIFIED: ICD-10-CM

## 2024-03-12 DIAGNOSIS — G80.9 CEREBRAL PALSY, UNSPECIFIED: ICD-10-CM

## 2024-03-12 DIAGNOSIS — R25.2 CRAMP AND SPASM: ICD-10-CM

## 2024-03-12 DIAGNOSIS — R51.9 HEADACHE, UNSPECIFIED: ICD-10-CM

## 2024-03-12 LAB
ALBUMIN SERPL ELPH-MCNC: 4.7 G/DL — SIGNIFICANT CHANGE UP (ref 3.3–5)
ALP SERPL-CCNC: 72 U/L — SIGNIFICANT CHANGE UP (ref 40–120)
ALT FLD-CCNC: 8 U/L — LOW (ref 10–45)
ANION GAP SERPL CALC-SCNC: 13 MMOL/L — SIGNIFICANT CHANGE UP (ref 5–17)
AST SERPL-CCNC: 10 U/L — SIGNIFICANT CHANGE UP (ref 10–40)
BILIRUB SERPL-MCNC: 0.3 MG/DL — SIGNIFICANT CHANGE UP (ref 0.2–1.2)
BUN SERPL-MCNC: 13 MG/DL — SIGNIFICANT CHANGE UP (ref 7–23)
CALCIUM SERPL-MCNC: 10.1 MG/DL — SIGNIFICANT CHANGE UP (ref 8.4–10.5)
CHLORIDE SERPL-SCNC: 100 MMOL/L — SIGNIFICANT CHANGE UP (ref 96–108)
CO2 SERPL-SCNC: 29 MMOL/L — SIGNIFICANT CHANGE UP (ref 22–31)
CREAT SERPL-MCNC: 0.46 MG/DL — LOW (ref 0.5–1.3)
EGFR: 120 ML/MIN/1.73M2 — SIGNIFICANT CHANGE UP
GLUCOSE SERPL-MCNC: 76 MG/DL — SIGNIFICANT CHANGE UP (ref 70–99)
HCT VFR BLD CALC: 39.6 % — SIGNIFICANT CHANGE UP (ref 34.5–45)
HGB BLD-MCNC: 13.5 G/DL — SIGNIFICANT CHANGE UP (ref 11.5–15.5)
MCHC RBC-ENTMCNC: 32 PG — SIGNIFICANT CHANGE UP (ref 27–34)
MCHC RBC-ENTMCNC: 34.1 GM/DL — SIGNIFICANT CHANGE UP (ref 32–36)
MCV RBC AUTO: 93.8 FL — SIGNIFICANT CHANGE UP (ref 80–100)
PLATELET # BLD AUTO: 191 K/UL — SIGNIFICANT CHANGE UP (ref 150–400)
POTASSIUM SERPL-MCNC: 4.7 MMOL/L — SIGNIFICANT CHANGE UP (ref 3.5–5.3)
POTASSIUM SERPL-SCNC: 4.7 MMOL/L — SIGNIFICANT CHANGE UP (ref 3.5–5.3)
PROT SERPL-MCNC: 7.5 G/DL — SIGNIFICANT CHANGE UP (ref 6–8.3)
RBC # BLD: 4.22 M/UL — SIGNIFICANT CHANGE UP (ref 3.8–5.2)
RBC # FLD: 11.9 % — SIGNIFICANT CHANGE UP (ref 10.3–14.5)
SODIUM SERPL-SCNC: 142 MMOL/L — SIGNIFICANT CHANGE UP (ref 135–145)
VALPROATE SERPL-MCNC: 84 UG/ML — SIGNIFICANT CHANGE UP (ref 50–100)
WBC # BLD: 8.13 K/UL — SIGNIFICANT CHANGE UP (ref 3.8–10.5)
WBC # FLD AUTO: 8.13 K/UL — SIGNIFICANT CHANGE UP (ref 3.8–10.5)

## 2024-03-12 PROCEDURE — G0463: CPT

## 2024-03-12 PROCEDURE — 85027 COMPLETE CBC AUTOMATED: CPT

## 2024-03-12 PROCEDURE — 80053 COMPREHEN METABOLIC PANEL: CPT

## 2024-03-12 PROCEDURE — 80164 ASSAY DIPROPYLACETIC ACD TOT: CPT

## 2024-03-12 NOTE — END OF VISIT
[] : Resident [Time Spent: ___ minutes] : I have spent [unfilled] minutes of time on the encounter. [FreeTextEntry3] : Last VPA level within limits. Siezure free on current AED regimen. No new complaints.  - Refill AEDs - VPA, CBC/CMP prior to next visit  F/u 1 year.

## 2024-03-12 NOTE — DISCUSSION/SUMMARY
[FreeTextEntry1] : 44yo woman with CP, MR, deafness and mutism, seizure disorder; currently stable on ASM regimen - no seizures since last visit. CBC/CMP performed in 12/2023 - unremarkable. VPA level 12/6/2023 - 82 (albumin 4.6).  Recommend: [] Obtain VPA level, CBC/CMP before next visit  [] Continue:  Divalproex sodium 250 mg / 500 mg (refill sent x 1 year supply) Clonazepam 0.25 mg ODT QD Folic acid 1 mg QD Olanzapine 2.5 mg qHS Sertraline 100 mg qAM Vitamin D 2,000 unit QD  [] RTC 1 year.  Case discussed with Dr. AMINAH Patel.

## 2024-03-12 NOTE — REASON FOR VISIT
[Follow-Up: _____] : a [unfilled] follow-up visit [Time Spent: ____ minutes] : Total time spent using  services: [unfilled] minutes. The patient's primary language is not English thus required  services. [TWNoteComboBox1] : American Sign Language

## 2024-03-12 NOTE — PHYSICAL EXAM
[FreeTextEntry1] : Mental Status: Awake/alert. Oriented x3 (knows name, that she is in the hospital, day of week, date), attends to examiner, follows commands. No verbal output. Communicating via sign language.  CN: PEERL, EOMI in the horizontal plane - does not follow instructions regarding upward/downward gaze or cannot perform. Facial sensation (V1-V3) intact. Deaf.  Motor: b/l contractures noted in UE; spontaneous movement of extremities x 4. Choreiform movements of extremities x 4. grossly 5/5 b/l UE and LE; DTR unable to evaluate due to constant movement. Spasticity noted in all 4 limbs.  Sensory: intact b/l UE and LE to LT  Gait: ambulates independently.

## 2024-03-12 NOTE — HISTORY OF PRESENT ILLNESS
[FreeTextEntry1] : Interval history 3/12/2024: Accompanied by her medical liaison.  at bedside. No seizures since last visit. Needs a VPA refill. Taking same medications as below. Per liaison, she has seen psychiatry given some of her medications are for behavior/mood. Patient says she "feels good." No complaints.  3/9/23: Patient reports that she feels well. No seizures in the interim. No change in medications since last visit. Medications as below, per documentation provided by group home employee.  Current medications:  Valproic acid 250 mg / 500 mg (needs refill)  Clonazepam 0.25 mg ODT QD  Folic acid 1 mg QD  Olanzapine 2.5 mg qHS  Sertraline 100 mg qAM  Vitamin D 2,000 unit QD  -----  Patient is a 40 yo F with CP, MR, deafness and mutism, seizure disorder who follows with Neurology clinic. Patient presents for follow up visit. Group home employee and ALS  at bedside to provide collateral. No concerns or acute events since prior visit. Patient is tolerating her medications well. Patient reports no complaints. Pt lives in a group home and per group home employee, she has not had a seizure in >10 years. Patient currently feeling well on /500, Klonopin 0.25mg qHS, sertaline 100mg QD and olanzapine 2.5mg qHS. She is compliant with all her medications. Patient denies any further events concerning for seizures or ER visits. At baseline, pt able to ambulate independently, follows commands, nonverbal. Pt has been less agitated.  Meds:  Valproic acid 250mg - 500mg  Klonipin 0.25 mg nightly  Sertraline 100 mg OD daily in morning  Olanzapine 2.5 mg nightly

## 2024-03-19 ENCOUNTER — NON-APPOINTMENT (OUTPATIENT)
Age: 44
End: 2024-03-19

## 2024-04-08 RX ORDER — CHOLECALCIFEROL (VITAMIN D3) 50 MCG
50 MCG TABLET ORAL
Qty: 30 | Refills: 3 | Status: ACTIVE | COMMUNITY
Start: 2017-10-25 | End: 1900-01-01

## 2024-04-12 ENCOUNTER — APPOINTMENT (OUTPATIENT)
Dept: GASTROENTEROLOGY | Facility: CLINIC | Age: 44
End: 2024-04-12
Payer: MEDICARE

## 2024-04-12 VITALS
BODY MASS INDEX: 19.83 KG/M2 | WEIGHT: 105 LBS | SYSTOLIC BLOOD PRESSURE: 99 MMHG | DIASTOLIC BLOOD PRESSURE: 72 MMHG | HEIGHT: 61 IN | HEART RATE: 74 BPM | OXYGEN SATURATION: 99 %

## 2024-04-12 PROCEDURE — 99203 OFFICE O/P NEW LOW 30 MIN: CPT

## 2024-04-13 NOTE — PHYSICAL EXAM
[No Acute Distress] : no acute distress [Sclera] : the sclera and conjunctiva were normal [No Respiratory Distress] : no respiratory distress [Heart Rate And Rhythm] : heart rate was normal and rhythm regular

## 2024-04-13 NOTE — HISTORY OF PRESENT ILLNESS
[FreeTextEntry1] : Ms. Jeanette Riggs is a 44 yo woman with cerebral palsy, seizure disorder, spasm d/o, G6PD deficiency, congenital deafness/mutism (sign language communication w/ ), and axial degenerative myopia who presents to discuss colorectal cancer screening. She is accompanied by her longtime aide; history obtained from patient and aide. ASL video  [id number] utilized for this visit.  Jeanette Alas denies abdominal pain, nausea/vomiting, heartburn/regurgitation, dysphagia/odynophagia, bloody or black stools, constipation or diarrhea, weight loss. Aide corroborates this. Jeanette Alas used to cough with eating but this has resolved since starting thickened liquids only.  No family history of CRC. Patient had normal CBC/CMP in 12/2023.  Jeanette Alas is intellectually impaired but aide feels she could complete a small volume bowel prep and cooperate with sedated exam if needed. No cardiovascular disease.

## 2024-04-13 NOTE — ASSESSMENT
[FreeTextEntry1] : 43F CP, seizure d/o, congenital deafness/mutism presents to discuss CRC screening.  #CRC screening  - discussed noninvasive methods for CRC screening, including q3y Cologuard (correctly identifies colon cancer 94% of the time, has a ~10% false positive rate), q1y FIT testing (sensitivity 85-90% for CRC), the benefits of these tests including convenience and noninvasive nature, and the drawbacks including failure to detect pre-cancerous lesions and the need for a colonoscopy with a positive result. Discussed colonoscopy and its benefits (identification and removal of pre-cancerous lesions, biopsy of malignant-appearing lesions) and drawbacks (need for prep, risk of bleeding/infection/perforation). Patient and aide elect to proceed with FIT testing

## 2024-04-18 ENCOUNTER — APPOINTMENT (OUTPATIENT)
Dept: SPEECH THERAPY | Facility: HOSPITAL | Age: 44
End: 2024-04-18
Payer: MEDICARE

## 2024-04-18 ENCOUNTER — OUTPATIENT (OUTPATIENT)
Dept: OUTPATIENT SERVICES | Facility: HOSPITAL | Age: 44
LOS: 1 days | End: 2024-04-18

## 2024-04-18 ENCOUNTER — APPOINTMENT (OUTPATIENT)
Dept: RADIOLOGY | Facility: HOSPITAL | Age: 44
End: 2024-04-18
Payer: MEDICARE

## 2024-04-18 ENCOUNTER — NON-APPOINTMENT (OUTPATIENT)
Age: 44
End: 2024-04-18

## 2024-04-18 DIAGNOSIS — R13.10 DYSPHAGIA, UNSPECIFIED: ICD-10-CM

## 2024-04-18 PROCEDURE — 74230 X-RAY XM SWLNG FUNCJ C+: CPT | Mod: 26
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
## 2024-04-21 ENCOUNTER — NON-APPOINTMENT (OUTPATIENT)
Age: 44
End: 2024-04-21

## 2024-05-03 ENCOUNTER — APPOINTMENT (OUTPATIENT)
Dept: OBGYN | Facility: CLINIC | Age: 44
End: 2024-05-03
Payer: MEDICARE

## 2024-05-03 ENCOUNTER — OUTPATIENT (OUTPATIENT)
Dept: OUTPATIENT SERVICES | Facility: HOSPITAL | Age: 44
LOS: 1 days | End: 2024-05-03
Payer: MEDICARE

## 2024-05-03 VITALS — BODY MASS INDEX: 21.54 KG/M2 | SYSTOLIC BLOOD PRESSURE: 108 MMHG | WEIGHT: 114 LBS | DIASTOLIC BLOOD PRESSURE: 70 MMHG

## 2024-05-03 DIAGNOSIS — Z01.419 ENCOUNTER FOR GYNECOLOGICAL EXAMINATION (GENERAL) (ROUTINE) W/OUT ABNORMAL FINDINGS: ICD-10-CM

## 2024-05-03 DIAGNOSIS — Z00.00 ENCOUNTER FOR GENERAL ADULT MEDICAL EXAMINATION W/OUT ABNORMAL FINDINGS: ICD-10-CM

## 2024-05-03 DIAGNOSIS — N76.0 ACUTE VAGINITIS: ICD-10-CM

## 2024-05-03 PROCEDURE — G0101: CPT

## 2024-05-05 PROBLEM — Z01.419 WELL FEMALE EXAM WITH ROUTINE GYNECOLOGICAL EXAM: Status: ACTIVE | Noted: 2022-04-28

## 2024-05-05 NOTE — PHYSICAL EXAM
[Chaperone Present] : A chaperone was present in the examining room during all aspects of the physical examination [[id number]] : A chaperone was present during the pelvic exam. [Awake] : awake [Alert] : alert [Acute Distress] : no acute distress [Mass] : no breast mass [Nipple Discharge] : no nipple discharge [Axillary LAD] : no axillary lymphadenopathy [Soft] : soft [Tender] : non tender [Distended] : not distended [H/Smegaly] : no hepatosplenomegaly [Oriented x3] : oriented to person, place, and time [Normal] : uterus [Labia Majora] : labia major [Labia Minora] : labia minora [No Bleeding] : there was no active vaginal bleeding [Pap Obtained] : a Pap smear was performed [Motion Tenderness] : there was no cervical motion tenderness [Normal Position] : in a normal position [Uterine Adnexae] : were not tender and not enlarged

## 2024-05-06 ENCOUNTER — APPOINTMENT (OUTPATIENT)
Dept: OTOLARYNGOLOGY | Facility: CLINIC | Age: 44
End: 2024-05-06
Payer: MEDICARE

## 2024-05-06 DIAGNOSIS — Z01.419 ENCOUNTER FOR GYNECOLOGICAL EXAMINATION (GENERAL) (ROUTINE) WITHOUT ABNORMAL FINDINGS: ICD-10-CM

## 2024-05-06 PROCEDURE — 92526 ORAL FUNCTION THERAPY: CPT | Mod: GN

## 2024-05-23 ENCOUNTER — APPOINTMENT (OUTPATIENT)
Dept: OTOLARYNGOLOGY | Facility: CLINIC | Age: 44
End: 2024-05-23
Payer: MEDICARE

## 2024-05-23 PROCEDURE — 92526 ORAL FUNCTION THERAPY: CPT | Mod: GN

## 2024-06-10 DIAGNOSIS — R19.5 OTHER FECAL ABNORMALITIES: ICD-10-CM

## 2024-06-10 LAB — HEMOCCULT STL QL IA: POSITIVE

## 2024-06-10 RX ORDER — SODIUM SULFATE, POTASSIUM SULFATE AND MAGNESIUM SULFATE 1.6; 3.13; 17.5 G/177ML; G/177ML; G/177ML
17.5-3.13-1.6 SOLUTION ORAL
Qty: 2 | Refills: 0 | Status: ACTIVE | COMMUNITY
Start: 2024-06-10 | End: 1900-01-01

## 2024-06-14 ENCOUNTER — APPOINTMENT (OUTPATIENT)
Dept: OTOLARYNGOLOGY | Facility: CLINIC | Age: 44
End: 2024-06-14

## 2024-06-28 ENCOUNTER — NON-APPOINTMENT (OUTPATIENT)
Age: 44
End: 2024-06-28

## 2024-06-28 ENCOUNTER — OUTPATIENT (OUTPATIENT)
Dept: OUTPATIENT SERVICES | Facility: HOSPITAL | Age: 44
LOS: 1 days | End: 2024-06-28
Payer: MEDICARE

## 2024-06-28 ENCOUNTER — APPOINTMENT (OUTPATIENT)
Dept: INTERNAL MEDICINE | Facility: CLINIC | Age: 44
End: 2024-06-28
Payer: MEDICARE

## 2024-06-28 VITALS
DIASTOLIC BLOOD PRESSURE: 60 MMHG | HEART RATE: 77 BPM | BODY MASS INDEX: 20.01 KG/M2 | WEIGHT: 106 LBS | HEIGHT: 61 IN | SYSTOLIC BLOOD PRESSURE: 82 MMHG | OXYGEN SATURATION: 96 %

## 2024-06-28 VITALS
HEIGHT: 61 IN | DIASTOLIC BLOOD PRESSURE: 80 MMHG | OXYGEN SATURATION: 97 % | HEART RATE: 62 BPM | BODY MASS INDEX: 33.42 KG/M2 | WEIGHT: 177 LBS | SYSTOLIC BLOOD PRESSURE: 132 MMHG

## 2024-06-28 DIAGNOSIS — I10 ESSENTIAL (PRIMARY) HYPERTENSION: ICD-10-CM

## 2024-06-28 PROCEDURE — 99213 OFFICE O/P EST LOW 20 MIN: CPT | Mod: GE

## 2024-06-28 PROCEDURE — G0463: CPT

## 2024-07-01 DIAGNOSIS — Z01.818 ENCOUNTER FOR OTHER PREPROCEDURAL EXAMINATION: ICD-10-CM

## 2024-07-02 ENCOUNTER — OUTPATIENT (OUTPATIENT)
Dept: OUTPATIENT SERVICES | Facility: HOSPITAL | Age: 44
LOS: 1 days | End: 2024-07-02
Payer: MEDICARE

## 2024-07-02 ENCOUNTER — APPOINTMENT (OUTPATIENT)
Dept: NEUROLOGY | Facility: HOSPITAL | Age: 44
End: 2024-07-02
Payer: MEDICARE

## 2024-07-02 VITALS
SYSTOLIC BLOOD PRESSURE: 93 MMHG | WEIGHT: 105 LBS | RESPIRATION RATE: 14 BRPM | TEMPERATURE: 98 F | DIASTOLIC BLOOD PRESSURE: 65 MMHG | BODY MASS INDEX: 19.83 KG/M2 | HEIGHT: 61 IN | HEART RATE: 82 BPM | OXYGEN SATURATION: 98 %

## 2024-07-02 DIAGNOSIS — R51.9 HEADACHE, UNSPECIFIED: ICD-10-CM

## 2024-07-02 PROCEDURE — G0463: CPT

## 2024-07-02 PROCEDURE — G2211 COMPLEX E/M VISIT ADD ON: CPT

## 2024-07-02 PROCEDURE — 99202 OFFICE O/P NEW SF 15 MIN: CPT

## 2024-07-09 ENCOUNTER — RESULT REVIEW (OUTPATIENT)
Age: 44
End: 2024-07-09

## 2024-07-09 ENCOUNTER — OUTPATIENT (OUTPATIENT)
Dept: OUTPATIENT SERVICES | Facility: HOSPITAL | Age: 44
LOS: 1 days | End: 2024-07-09
Payer: MEDICARE

## 2024-07-09 ENCOUNTER — TRANSCRIPTION ENCOUNTER (OUTPATIENT)
Age: 44
End: 2024-07-09

## 2024-07-09 ENCOUNTER — APPOINTMENT (OUTPATIENT)
Dept: GASTROENTEROLOGY | Facility: HOSPITAL | Age: 44
End: 2024-07-09

## 2024-07-09 VITALS
DIASTOLIC BLOOD PRESSURE: 77 MMHG | OXYGEN SATURATION: 94 % | SYSTOLIC BLOOD PRESSURE: 109 MMHG | HEIGHT: 60 IN | RESPIRATION RATE: 12 BRPM | TEMPERATURE: 98 F | HEART RATE: 87 BPM | WEIGHT: 134.92 LBS

## 2024-07-09 VITALS
OXYGEN SATURATION: 95 % | SYSTOLIC BLOOD PRESSURE: 106 MMHG | HEART RATE: 96 BPM | DIASTOLIC BLOOD PRESSURE: 67 MMHG | RESPIRATION RATE: 20 BRPM

## 2024-07-09 DIAGNOSIS — R19.5 OTHER FECAL ABNORMALITIES: ICD-10-CM

## 2024-07-09 DIAGNOSIS — G40.909 EPILEPSY, UNSPECIFIED, NOT INTRACTABLE, WITHOUT STATUS EPILEPTICUS: ICD-10-CM

## 2024-07-09 PROCEDURE — 88305 TISSUE EXAM BY PATHOLOGIST: CPT | Mod: 26

## 2024-07-09 PROCEDURE — 88305 TISSUE EXAM BY PATHOLOGIST: CPT

## 2024-07-09 PROCEDURE — C1889: CPT

## 2024-07-09 PROCEDURE — 45385 COLONOSCOPY W/LESION REMOVAL: CPT

## 2024-07-09 DEVICE — NET RETRV ROT ROTH 2.5MMX230CM: Type: IMPLANTABLE DEVICE | Status: FUNCTIONAL

## 2024-07-09 DEVICE — CLIP RESOLUTION 360 235CM: Type: IMPLANTABLE DEVICE | Status: FUNCTIONAL

## 2024-07-09 RX ORDER — DIVALPROEX SODIUM 500 MG
1 TABLET, DELAYED RELEASE (ENTERIC COATED) ORAL
Refills: 0 | DISCHARGE

## 2024-07-09 RX ORDER — LORATADINE 10 MG/1
1 TABLET ORAL
Refills: 0 | DISCHARGE

## 2024-07-09 RX ORDER — CLONAZEPAM 2 MG/1
0 TABLET ORAL
Refills: 0 | DISCHARGE

## 2024-07-09 RX ORDER — OLANZAPINE 2.5 MG/1
1 TABLET, FILM COATED ORAL
Refills: 0 | DISCHARGE

## 2024-07-09 RX ORDER — SERTRALINE HYDROCHLORIDE 100 MG/1
1 TABLET, FILM COATED ORAL
Refills: 0 | DISCHARGE

## 2024-07-09 RX ORDER — SODIUM CHLORIDE 0.9 % (FLUSH) 0.9 %
500 SYRINGE (ML) INJECTION
Refills: 0 | Status: DISCONTINUED | OUTPATIENT
Start: 2024-07-09 | End: 2024-07-24

## 2024-07-09 NOTE — PRE-ANESTHESIA EVALUATION ADULT - NSANTHADDINFOFT_GEN_ALL_CORE
r/b/a discussed with the brother of patient, all questions answered, all parties involved wish to proceed

## 2024-07-09 NOTE — PRE PROCEDURE NOTE - PRE PROCEDURE EVALUATION
Attending Physician:         Marie Albarran MD             Procedure: Colonoscopy    Indication for Procedure: Positive FIT  ________________________________________________________  PAST MEDICAL & SURGICAL HISTORY:  CP (cerebral palsy)      MR (mental retardation)      Deafness congenital      Myopia      CP (cerebral palsy)        ALLERGIES:  salicylates (Unknown)  napthoquitriolone, antimalarial medication (Unknown)  sulfamethoxazole (Unknown)  sulfa drugs (Unknown)    HOME MEDICATIONS:  clonazePAM 0.25 mg oral tablet: orally prn  divalproex sodium 250 mg oral delayed release tablet: 1 tab(s) orally 2 times a day  loratadine 10 mg oral tablet: 1 tab(s) orally once a day  OLANZapine 2.5 mg oral tablet: 1 tab(s) orally once a day  sertraline 100 mg oral tablet: 1 tab(s) orally once a day    AICD/PPM: [ ] yes   [ ] no    PERTINENT LAB DATA:                      PHYSICAL EXAMINATION:    Height (cm): 152.4  Weight (kg): 61.2  BMI (kg/m2): 26.4  BSA (m2): 1.58T(C): 36.7  HR: 87  BP: 109/77  RR: 12  SpO2: 94%    Constitutional: NAD  HEENT: PERRLA, EOMI,    Neck:  No JVD  Respiratory: CTAB/L  Cardiovascular: S1 and S2  Gastrointestinal: BS+, soft, NT/ND  Extremities: No peripheral edema  Neurological: A/O x 3, no focal deficits  Psychiatric: Normal mood, normal affect  Skin: No rashes    ASA Class: I [ ]  II [x ]  III [ ]  IV [ ]    COMMENTS:    The patient is a suitable candidate for the planned procedure unless box checked [ ]  No, explain:

## 2024-07-09 NOTE — ASU PATIENT PROFILE, ADULT - NSICDXPASTMEDICALHX_GEN_ALL_CORE_FT
PAST MEDICAL HISTORY:  CP (cerebral palsy)     CP (cerebral palsy)     Deafness congenital     MR (mental retardation)     Myopia

## 2024-07-11 ENCOUNTER — APPOINTMENT (OUTPATIENT)
Age: 44
End: 2024-07-11

## 2024-07-11 ENCOUNTER — APPOINTMENT (OUTPATIENT)
Age: 44
End: 2024-07-11
Payer: MEDICAID

## 2024-07-11 ENCOUNTER — OUTPATIENT (OUTPATIENT)
Dept: OUTPATIENT SERVICES | Facility: HOSPITAL | Age: 44
LOS: 1 days | End: 2024-07-11
Payer: MEDICARE

## 2024-07-11 PROCEDURE — D1110: CPT

## 2024-07-11 PROCEDURE — D0120: CPT

## 2024-07-11 PROCEDURE — ZZZZZ: CPT

## 2024-07-12 ENCOUNTER — NON-APPOINTMENT (OUTPATIENT)
Age: 44
End: 2024-07-12

## 2024-07-12 LAB — SURGICAL PATHOLOGY STUDY: SIGNIFICANT CHANGE UP

## 2024-08-21 ENCOUNTER — OUTPATIENT (OUTPATIENT)
Dept: OUTPATIENT SERVICES | Facility: HOSPITAL | Age: 44
LOS: 1 days | End: 2024-08-21
Payer: MEDICARE

## 2024-08-21 ENCOUNTER — RESULT REVIEW (OUTPATIENT)
Age: 44
End: 2024-08-21

## 2024-08-21 ENCOUNTER — APPOINTMENT (OUTPATIENT)
Dept: ULTRASOUND IMAGING | Facility: IMAGING CENTER | Age: 44
End: 2024-08-21

## 2024-08-21 ENCOUNTER — APPOINTMENT (OUTPATIENT)
Dept: MAMMOGRAPHY | Facility: IMAGING CENTER | Age: 44
End: 2024-08-21
Payer: MEDICARE

## 2024-08-21 DIAGNOSIS — N63.0 UNSPECIFIED LUMP IN UNSPECIFIED BREAST: ICD-10-CM

## 2024-08-21 PROCEDURE — 77063 BREAST TOMOSYNTHESIS BI: CPT | Mod: 26

## 2024-08-21 PROCEDURE — 76641 ULTRASOUND BREAST COMPLETE: CPT

## 2024-08-21 PROCEDURE — 76641 ULTRASOUND BREAST COMPLETE: CPT | Mod: 26,50,GA

## 2024-08-21 PROCEDURE — 77067 SCR MAMMO BI INCL CAD: CPT

## 2024-08-21 PROCEDURE — 77067 SCR MAMMO BI INCL CAD: CPT | Mod: 26

## 2024-08-21 PROCEDURE — 77063 BREAST TOMOSYNTHESIS BI: CPT

## 2024-08-21 RX ORDER — INFLUENZA A VIRUS A/VICTORIA/4897/2022 IVR-238 (H1N1) ANTIGEN (FORMALDEHYDE INACTIVATED), INFLUENZA A VIRUS A/CALIFORNIA/122/2022 SAN-022 (H3N2) ANTIGEN (FORMALDEHYDE INACTIVATED), AND INFLUENZA B VIRUS B/MICHIGAN/01/2021 ANTIGEN (FORMALDEHYDE INACTIVATED) 15; 15; 15 MG/.5ML; MG/.5ML; MG/.5ML
0.5 INJECTION, SUSPENSION INTRAMUSCULAR
Qty: 1 | Refills: 0 | Status: ACTIVE | OUTPATIENT
Start: 2024-08-21

## 2024-09-13 DIAGNOSIS — Z01.20 ENCOUNTER FOR DENTAL EXAMINATION AND CLEANING WITHOUT ABNORMAL FINDINGS: ICD-10-CM

## 2024-09-17 ENCOUNTER — APPOINTMENT (OUTPATIENT)
Dept: GASTROENTEROLOGY | Facility: HOSPITAL | Age: 44
End: 2024-09-17

## 2024-10-08 ENCOUNTER — NON-APPOINTMENT (OUTPATIENT)
Age: 44
End: 2024-10-08

## 2025-04-16 NOTE — ED ADULT NURSE NOTE - PAIN: PRESENCE, MLM
SW made multiple attempts to contact pt over the past few months, left VM today offering to discuss palliative care and in-home care resources, per request of Dr. Polo.     Pt called SW back, discussed palliative care and in-home care options such as Medicaid waivers.    Pt reported that her  is no longer working so that he is able to care for her. Pt feels that her needs are being met and they do not need additional assistance at this time. SW suggested looking into the programs should they decide to get more help. [facility]ITTY sent info on in-home care resources.     [facility]    Neurology ? Movement Disorders  [facility] ? Main [address] [address]   P: [phone]  F: [phone]   
denies pain/discomfort

## (undated) DEVICE — SENSOR O2 FINGER ADULT

## (undated) DEVICE — CATH IV SAFE BC 20G X 1.16" (PINK)

## (undated) DEVICE — SYR LUER LOK 50CC

## (undated) DEVICE — SOL INJ NS 0.9% 500ML 2 PORT

## (undated) DEVICE — BRUSH COLONOSCOPY CYTOLOGY

## (undated) DEVICE — PACK IV START WITH CHG

## (undated) DEVICE — CATH IV SAFE BC 22G X 1" (BLUE)

## (undated) DEVICE — POLY TRAP ETRAP

## (undated) DEVICE — TUBING SUCTION 20FT

## (undated) DEVICE — TUBING SUCTION CONN 6FT STERILE

## (undated) DEVICE — CLAMP BX HOT RAD JAW 3

## (undated) DEVICE — ELCTR GROUNDING PAD ADULT COVIDIEN

## (undated) DEVICE — TUBING IV SET GRAVITY 3Y 100" MACRO

## (undated) DEVICE — SNARE EXACTO COLD 9MMX230CM

## (undated) DEVICE — IRRIGATOR BIO SHIELD

## (undated) DEVICE — SUCTION YANKAUER NO CONTROL VENT

## (undated) DEVICE — FORCEP RADIAL JAW 4 JUMBO 2.8MM 3.2MM 240CM ORANGE DISP

## (undated) DEVICE — BIOPSY FORCEP RADIAL JAW 4 STANDARD WITH NEEDLE

## (undated) DEVICE — FOLEY HOLDER STATLOCK 2 WAY ADULT

## (undated) DEVICE — ENDOCUFF VISION SZ 3 SM PRPL